# Patient Record
Sex: MALE | Race: WHITE | NOT HISPANIC OR LATINO | URBAN - METROPOLITAN AREA
[De-identification: names, ages, dates, MRNs, and addresses within clinical notes are randomized per-mention and may not be internally consistent; named-entity substitution may affect disease eponyms.]

---

## 2022-12-05 ENCOUNTER — OFFICE VISIT (OUTPATIENT)
Dept: OBGYN CLINIC | Facility: CLINIC | Age: 32
End: 2022-12-05

## 2022-12-05 ENCOUNTER — APPOINTMENT (OUTPATIENT)
Dept: RADIOLOGY | Facility: CLINIC | Age: 32
End: 2022-12-05

## 2022-12-05 VITALS
WEIGHT: 234 LBS | HEART RATE: 73 BPM | HEIGHT: 73 IN | DIASTOLIC BLOOD PRESSURE: 95 MMHG | SYSTOLIC BLOOD PRESSURE: 147 MMHG | BODY MASS INDEX: 31.01 KG/M2

## 2022-12-05 DIAGNOSIS — M62.9 HAMSTRING TIGHTNESS OF BOTH LOWER EXTREMITIES: ICD-10-CM

## 2022-12-05 DIAGNOSIS — M22.2X1 PATELLOFEMORAL SYNDROME OF BOTH KNEES: ICD-10-CM

## 2022-12-05 DIAGNOSIS — M25.361 KNEE INSTABILITY, RIGHT: ICD-10-CM

## 2022-12-05 DIAGNOSIS — R29.898 WEAKNESS OF BOTH HIPS: ICD-10-CM

## 2022-12-05 DIAGNOSIS — M22.2X1 PATELLOFEMORAL SYNDROME OF BOTH KNEES: Primary | ICD-10-CM

## 2022-12-05 DIAGNOSIS — M22.2X2 PATELLOFEMORAL SYNDROME OF BOTH KNEES: ICD-10-CM

## 2022-12-05 DIAGNOSIS — M22.2X2 PATELLOFEMORAL SYNDROME OF BOTH KNEES: Primary | ICD-10-CM

## 2022-12-05 NOTE — PROGRESS NOTES
Assessment/Plan:  Assessment/Plan   Diagnoses and all orders for this visit:    Patellofemoral syndrome of both knees  -     XR knee 4+ vw left injury; Future  -     XR knee 4+ vw right injury; Future  -     Brace  -     Ambulatory Referral to Physical Therapy; Future    Weakness of both hips  -     Ambulatory Referral to Physical Therapy; Future    Hamstring tightness of both lower extremities  -     Ambulatory Referral to Physical Therapy; Future    Knee instability, right  -     Ambulatory Referral to Physical Therapy; Future        69-year-old male [address] [place] [address] with bilateral knee pain, right worse than left, more than few years duration  Discussed with patient physical exam, radiographs, impression and plan  X-rays knees are unremarkable for osseous abnormality or significant degenerative changes  Physical exam right knee noted for tenderness patellar facet, medial joint line, and popliteal fossa  He has full extension and flexion to 130°  There is no appreciable collateral ligament laxity  Left knee unremarkable for bony or soft tissue tenderness  He has abnormal patellar tracking both knees  There is no groin pain with TANNA and FADDIR maneuvers of the hips  He has hamstring tightness both lower extremities and weakness both hips with abduction  Clinical impression is that he is symptomatic from abnormal patellofemoral mechanics  I discussed regimen of supplements, bracing, and formal therapy  Invasive management/surgery not warranted at this time  I provided patellar stabilizing knee braces to wear during physical activity  He is to start taking tumeric at least 1000 mg daily, tart cherry at least 1000 mg daily, and glucosamine-chondroitin 2 to 3 times a day  He is to start physical therapy as soon as possible and do home exercises as directed  He will follow up with me as needed  Subjective:   Patient ID: Destiney Engel is a 27 y o  male    Chief Complaint   Patient presents with   • Right Knee - Pain       78-year-old male [facility] presents for evaluation of bilateral knee pain, right worse than left, more than few years duration  He reports having had injury to right knee while he was still [employer], however was not fully evaluated  He was symptomatic with pain described as generalized knee but worse at the anterior infrapatellar aspect and posterior aspect, nonradiating, worse with prolonged standing and ambulation, worse when rising after prolonged sitting, associated clicking and instability, and improved with resting or changing position  He has been tried wearing knee braces, and does not usually take medication for symptoms  He reports have had x-rays done sometime ago however was not given clear recommendations for treatment  Knee Pain  This is a chronic problem  The current episode started more than 1 year ago  The problem occurs daily  The problem has been gradually worsening  Associated symptoms include arthralgias  Pertinent negatives include no abdominal pain, chest pain, chills, fever, joint swelling, numbness, rash, sore throat or weakness  The symptoms are aggravated by bending, standing and walking  He has tried rest and position changes (Knee brace) for the symptoms  The treatment provided mild relief  The following portions of the patient's history were reviewed and updated as appropriate: He  has no past medical history on file  He  has a past surgical history that includes Shoulder surgery and Elbow surgery (Right)  His family history includes No Known Problems in his father and mother  He  reports that he has quit smoking  His smoking use included cigarettes  He has quit using smokeless tobacco  No history on file for alcohol use and drug use  He has No Known Allergies       Review of Systems   Constitutional: Negative for chills and fever  HENT: Negative for sore throat  Eyes: Negative for visual disturbance  Respiratory: Negative for shortness of breath  Cardiovascular: Negative for chest pain  Gastrointestinal: Negative for abdominal pain  Genitourinary: Negative for flank pain  Musculoskeletal: Positive for arthralgias  Negative for joint swelling  Skin: Negative for rash and wound  Neurological: Negative for weakness and numbness  Hematological: Does not bruise/bleed easily  Psychiatric/Behavioral: Negative for self-injury  Objective:  Vitals:    12/05/22 1306   BP: 147/95   Pulse: 73   Weight: 106 kg (234 lb)   Height: 6' 1" (1 854 m)     Right Ankle Exam     Muscle Strength   Dorsiflexion:  5/5  Plantar flexion:  5/5      Left Ankle Exam     Muscle Strength   Dorsiflexion:  5/5   Plantar flexion:  5/5       Right Knee Exam     Muscle Strength   The patient has normal right knee strength  Tenderness   The patient is experiencing tenderness in the medial joint line (Patellar facet, popliteal fossa)  Range of Motion   Extension: normal   Flexion: 130     Tests   Varus: negative Valgus: negative    Other   Swelling: none    Comments:  Abnormal patellar tracking      Left Knee Exam     Range of Motion   Extension: normal     Other   Swelling: none    Comments:  Abnormal patellar tracking      Right Hip Exam     Muscle Strength   Abduction: 4/5   Flexion: 5/5     Tests   TANNA: negative    Comments:  Negative FADDIR  Hamstring tightness      Left Hip Exam     Muscle Strength   Abduction: 4/5   Flexion: 5/5     Tests   TANNA: negative    Comments:  Negative FADDIR  Hamstring tightness          Strength/Myotome Testing     Left Ankle/Foot   Dorsiflexion: 5  Plantar flexion: 5    Right Ankle/Foot   Dorsiflexion: 5  Plantar flexion: 5      Physical Exam  Vitals and nursing note reviewed  Constitutional:       General: He is not in acute distress  Appearance: He is well-developed  He is not ill-appearing or diaphoretic  HENT:      Head: Normocephalic and atraumatic        Right Ear: External ear normal       Left Ear: External ear normal    Eyes:      Conjunctiva/sclera: Conjunctivae normal    Neck:      Trachea: No tracheal deviation  Cardiovascular:      Rate and Rhythm: Normal rate  Pulmonary:      Effort: Pulmonary effort is normal  No respiratory distress  Abdominal:      General: There is no distension  Musculoskeletal:         General: Tenderness present  No swelling, deformity or signs of injury  Skin:     General: Skin is warm and dry  Coloration: Skin is not jaundiced or pale  Neurological:      Mental Status: He is alert and oriented to person, place, and time  Psychiatric:         Mood and Affect: Mood and affect and mood normal          Behavior: Behavior normal          Thought Content: Thought content normal          Judgment: Judgment normal          I have personally reviewed pertinent films in PACS and my interpretation is No osseous abnormality or significant degenerative changes of the knees

## 2022-12-19 ENCOUNTER — EVALUATION (OUTPATIENT)
Dept: PHYSICAL THERAPY | Facility: CLINIC | Age: 32
End: 2022-12-19

## 2022-12-19 DIAGNOSIS — M22.2X1 PATELLOFEMORAL SYNDROME OF BOTH KNEES: ICD-10-CM

## 2022-12-19 DIAGNOSIS — R29.898 WEAKNESS OF BOTH HIPS: ICD-10-CM

## 2022-12-19 DIAGNOSIS — M62.9 HAMSTRING TIGHTNESS OF BOTH LOWER EXTREMITIES: ICD-10-CM

## 2022-12-19 DIAGNOSIS — M22.2X2 PATELLOFEMORAL SYNDROME OF BOTH KNEES: ICD-10-CM

## 2022-12-19 DIAGNOSIS — M25.361 KNEE INSTABILITY, RIGHT: ICD-10-CM

## 2022-12-19 NOTE — PROGRESS NOTES
PT Evaluation     Today's date: 2022  Patient name: Aby Falcon  : 1990  MRN: [id number]  Referring provider: Saniya Martino  Dx:   Encounter Diagnosis     ICD-10-CM    1  Patellofemoral syndrome of both knees  M22 2X1 Ambulatory Referral to Physical Therapy    M22 2X2       2  Weakness of both hips  R29 898 Ambulatory Referral to Physical Therapy      3  Hamstring tightness of both lower extremities  M62 9 Ambulatory Referral to Physical Therapy      4  Knee instability, right  M25 361 Ambulatory Referral to Physical Therapy          Start Time: 1515  Stop Time: 1600  Total time in clinic (min): 45 minutes    Assessment  Assessment details: Patient is a 27year old male who presents to OP PT with chief complaints of frequent R knee buckling/instability and B knee pain  Patient presents with full knee ROM, negative tests for ligamentous instability and intact sensation bilaterally  Deficits include B LE weakness with R>L, activity intolerance, R knee joint line tenderness, and ambulatory dysfunction  Patient demonstrates functional weakness on B LE and neuromuscular control with lateral step test  Functionally, he has difficulty standing for prolonged periods of time and walking  Discussed plan of care that will focus on LE strengthening with patient and if no progress is made referral for MRI will be warranted  Patient in agreement  Patient was given standing hip ABD, STS, and LAQ as HEP  Patient would benefit from skilled PT services in order to address these deficits and return to PLOF  Impairments: abnormal gait, abnormal movement, activity intolerance, impaired physical strength, lacks appropriate home exercise program and pain with function    Symptom irritability: lowUnderstanding of Dx/Px/POC: good   Prognosis: good    Goals  ST  Patient will be independent with HEP in 3 weeks  2  Patient will improve hip strength by 1 grade in 3 weeks  3   Patient will decrease self reported pain by 2 points in 3 weeks  LT  Patient will be able to stand for >30 min without limitation in 6 weeks  2  Patient will be able to ambulate 50 feet without restrictions or buckling in 6 weeks  3  Patient will be able to complete a shift at work without instances of knee buckling in 6 weeks    Plan  Patient would benefit from: skilled physical therapy  Planned therapy interventions: therapeutic exercise, therapeutic activities, strengthening, patient education, neuromuscular re-education, abdominal trunk stabilization and home exercise program  Frequency: 2x week  Duration in visits: 12  Duration in weeks: 6  Treatment plan discussed with: patient        Subjective Evaluation    History of Present Illness  Mechanism of injury: Patient reports that R knee is very "wobbly" and L knee is also unsteady  Patient states that they give out on him daily and this has been going on for years  Patient is unsure if he has had any prior knee injuries  No surgical history in the knees  Pain  Current pain ratin  At best pain ratin  At worst pain ratin  Location: R knee>L knee  Quality: dull ache  Aggravating factors: standing and walking  Progression: worsening    Social Support  Stairs in house: yes   12  Lives in: multiple-level home    Employment status: working  Treatments  No previous or current treatments  Patient Goals  Patient goal: more stability        Objective     Concurrent Complaints  Negative for night pain, bladder dysfunction and bowel dysfunction    Tenderness     Right Knee   Tenderness in the inferior patella, lateral joint line and medial joint line       Neurological Testing     Sensation     Lumbar   Left   Intact: light touch    Right   Intact: light touch    Knee   Left Knee   Intact: light touch    Right Knee   Intact: light touch     Reflexes   Left   Patellar (L4): normal (2+)    Right   Patellar (L4): normal (2+)    Active Range of Motion     Lumbar   Flexion: WFL  Extension:  WFL  Left lateral flexion:  WFL  Right lateral flexion:  WFL  Left rotation:  WFL  Right rotation:  WFL  Left Knee   Flexion: WFL  Extension: WFL    Right Knee   Flexion: WF  Extension: [facility]    Strength/Myotome Testing     Left Hip   Planes of Motion   Flexion: 4  Extension: 4  Abduction: 4+    Right Hip   Planes of Motion   Flexion: 4-  Extension: 4  Abduction: 4+    Left Knee   Flexion: 4  Prone flexion: 4  Extension: 4+    Right Knee   Flexion: 4-  Prone flexion: 4-  Extension: 4+    Left Ankle/Foot   Dorsiflexion: 5    Right Ankle/Foot   Dorsiflexion: 5    Tests     Left Knee   Negative anterior drawer, posterior drawer, valgus stress test at 30 degrees and varus stress test at 30 degrees  Right Knee   Negative anterior drawer, posterior drawer, valgus stress test at 30 degrees and varus stress test at 30 degrees       Ambulation     Ambulation: Level Surfaces   Ambulation without assistive device: independent    Additional Level Surfaces Ambulation Details  Ambulated with R LE maintained in increased ER compared to L LE, observable R knee buckling with more than 50% of steps    General Comments:      Knee Comments  SLS R: 30 sec with observed knee instability  SLS L: 30 sec with observed knee instability    Stairs: ascends and descends reciprocally without HR and minimal knee instability noted    Lateral step test: B knee valgus, lateral trunk lean and arm strategy      Access Code [email or web address]         Precautions: knee buckling    Manuals 12/19                                                                Neuro Re-Ed             Lateral step down             SLS on foam             Bosu lunge                                                                 Ther Ex             Standing hip ABD x10            LAQ x10            HS stretch             SLR             TB side stepping             Clamshell             Monster walk                          Ther Activity             STS x10 Step up lateral             Sled push             Step up/down             Gait Training                                       Modalities

## 2022-12-20 ENCOUNTER — OFFICE VISIT (OUTPATIENT)
Dept: PHYSICAL THERAPY | Facility: CLINIC | Age: 32
End: 2022-12-20

## 2022-12-20 DIAGNOSIS — M22.2X2 PATELLOFEMORAL SYNDROME OF BOTH KNEES: Primary | ICD-10-CM

## 2022-12-20 DIAGNOSIS — M22.2X1 PATELLOFEMORAL SYNDROME OF BOTH KNEES: Primary | ICD-10-CM

## 2022-12-20 DIAGNOSIS — R29.898 WEAKNESS OF BOTH HIPS: ICD-10-CM

## 2022-12-20 NOTE — PROGRESS NOTES
Daily Note     Today's date: 2022  Patient name: Alhaji Elise  : 1990  MRN: [id number]  Referring provider: Osmar Worthington  Dx:   Encounter Diagnosis     ICD-10-CM    1  Patellofemoral syndrome of both knees  M22 2X1     M22 2X2       2  Weakness of both hips  R29 898           Start Time: 1517  Stop Time: 1602  Total time in clinic (min): 45 minutes    Subjective: Patient has no new reports since yesterday's evaluation, says he is feeling good  Objective: See treatment diary below      Assessment: Patient demonstrates improving BLE strength this session  Patient completed today's session without pain  Recumbent bike performed in order to improve LE muscular endurance  Patient demonstrates improving neuromuscular control with lateral heel tap exercise with less knee valgus and trunk lean after therapist demonstration  Strength and balance deficits > on R LE compared to L  Future sessions should continue to progress LE strengthening exercises as able  Tolerated treatment well  Patient demonstrated fatigue post treatment and would benefit from continued PT      Plan: Continue per plan of care        Precautions: knee buckling    Manuals                                                                Neuro Re-Ed             Lateral step down  2x10           SLS w ball toss  1x10           Bosu lunge                                                                 Ther Ex             SL hip ABD  3x10  1#           Standing hip ABD x10 HEP           LAQ x10 3x10  #3           HS stretch             SLR  3x10  #3           TB side stepping  3 laps  gtb           Clamshell  3x10  rtb           Monster walk  3 laps  gtb           PB hamstring curl             Prone hip ext             Leg press  #45           Ther Activity             STS x10 2x10           Step up lateral  2x10                        Step up/down             Gait Training Modalities

## 2022-12-27 ENCOUNTER — OFFICE VISIT (OUTPATIENT)
Dept: PHYSICAL THERAPY | Facility: CLINIC | Age: 32
End: 2022-12-27

## 2022-12-27 DIAGNOSIS — M22.2X1 PATELLOFEMORAL SYNDROME OF BOTH KNEES: Primary | ICD-10-CM

## 2022-12-27 DIAGNOSIS — M22.2X2 PATELLOFEMORAL SYNDROME OF BOTH KNEES: Primary | ICD-10-CM

## 2022-12-27 DIAGNOSIS — M25.361 KNEE INSTABILITY, RIGHT: ICD-10-CM

## 2022-12-27 NOTE — PROGRESS NOTES
Daily Note     Today's date: 2022  Patient name: Silvina Flores  : 1990  MRN: [id number]  Referring provider: Carlos Tidwell  Dx:   Encounter Diagnosis     ICD-10-CM    1  Patellofemoral syndrome of both knees  M22 2X1     M22 2X2       2  Knee instability, right  M25 361           Start Time: 732  Stop Time: 818  Total time in clinic (min): 46 minutes    Subjective: Patient reports that his knees feel about the same since starting therapy last week  Patient states that he has been keeping up with his HEP  Objective: See treatment diary below      Assessment: Patient demonstrates improving BLE strength with static exercises this session  Patient completed today's session without pain  Recumbent bike performed in order to  improve LE muscular endurance  1 instance of R knee buckling at the beginning of the session but no LOB noted  Patient uses UEs to help maintain balance during lateral heel tap exercise due to deficits in neuromuscular control  Future sessions should continue to progress LE strengthening exercises as able  Tolerated treatment well  Patient demonstrated fatigue post treatment and would benefit from continued PT      Plan: Continue per plan of care        Precautions: knee buckling    Manuals                                                               Neuro Re-Ed             Lateral step down  2x10 2x10          SLS w ball toss  1x10 x20 ea          Bosu lunge                                                                 Ther Ex             SL hip ABD  3x10  1# 3x10  1#          Standing hip ABD x10 HEP           LAQ x10 3x10  #3 3x10  3#          HS stretch             SLR  3x10  #3 3x10  #3          TB side stepping  3 laps  gtb 6 laps  gtb          Clamshell  3x10  rtb 3x10  rtb          Monster walk  3 laps  gtb 6 laps  gtb          PB hamstring curl             Prone hip ext             Bridges   x20 5"          Bike  6 min 6 min Leg press  #45 #85          Ther Activity             STS x10 2x10 3x10          Step up lateral  2x10 2x10                       Step up/down             Gait Training                                       Modalities

## 2022-12-29 ENCOUNTER — OFFICE VISIT (OUTPATIENT)
Dept: PHYSICAL THERAPY | Facility: CLINIC | Age: 32
End: 2022-12-29

## 2022-12-29 DIAGNOSIS — M22.2X2 PATELLOFEMORAL SYNDROME OF BOTH KNEES: Primary | ICD-10-CM

## 2022-12-29 DIAGNOSIS — R29.898 WEAKNESS OF BOTH HIPS: ICD-10-CM

## 2022-12-29 DIAGNOSIS — M22.2X1 PATELLOFEMORAL SYNDROME OF BOTH KNEES: Primary | ICD-10-CM

## 2022-12-29 DIAGNOSIS — M25.361 KNEE INSTABILITY, RIGHT: ICD-10-CM

## 2022-12-29 NOTE — PROGRESS NOTES
Daily Note     Today's date: 2022  Patient name: Nguyen Dolan  : 1990  MRN: [id number]  Referring provider: Araseli Rome  Dx:   Encounter Diagnosis     ICD-10-CM    1  Patellofemoral syndrome of both knees  M22 2X1     M22 2X2       2  Knee instability, right  M25 361       3  Weakness of both hips  R29 898           Start Time: 4048  Stop Time: 08  Total time in clinic (min): 48 minutes    Subjective: Patient reports that his knees are not doing well  Patient states that he had an instance where his knee buckled and nearly fell all the way to the floor  Patient states that his knees just feel really weak  Objective: See treatment diary below      Assessment: Patient demonstrates improving BLE strength this session  Patient completed today's session without pain  Recumbent bike performed in order to improve LE muscular endurance  Increased frequency of knee buckling when ambulating in the clinic  Neuromuscular control is improving with patient able to perform lateral heel tap with 8" step  L neuromuscular control > R  Future sessions should continue to progress LE strengthening exercises as able  Tolerated treatment well  Patient would benefit from continued PT        Plan: Continue per plan of care        Precautions: knee buckling    Manuals                                                              Neuro Re-Ed             Lateral step down  2x10 2x10 2x10  8" step         SLS w ball toss  1x10 x20 ea x20 ylw w foam         Bosu lunge                                                                 Ther Ex             SL hip ABD  3x10  1# 3x10  1# 3x10  #2         Standing hip ABD x10 HEP           LAQ x10 3x10  #3 3x10  3# 4x10  #4         HS stretch             SLR  3x10  #3 3x10  #3 4x10  #4         TB side stepping  3 laps  gtb 6 laps  gtb 6 laps   btb         Clamshell  3x10  rtb 3x10  rtb 3x10  rtb         Monster walk  3 laps  gtb 6 laps  gtb 6 laps  btb         PB hamstring curl    2x10         Prone hip ext             Bridges   x20 5"          Bike  6 min 6 min 6 min         Leg press  #45 #85 #85 x30         Ther Activity             STS x10 2x10 3x10 3x10         Step up lateral  2x10 2x10                       Step up/down             Gait Training                                       Modalities

## 2023-01-03 ENCOUNTER — OFFICE VISIT (OUTPATIENT)
Dept: PHYSICAL THERAPY | Facility: CLINIC | Age: 33
End: 2023-01-03

## 2023-01-03 DIAGNOSIS — M22.2X1 PATELLOFEMORAL SYNDROME OF BOTH KNEES: Primary | ICD-10-CM

## 2023-01-03 DIAGNOSIS — M22.2X2 PATELLOFEMORAL SYNDROME OF BOTH KNEES: Primary | ICD-10-CM

## 2023-01-03 DIAGNOSIS — R29.898 WEAKNESS OF BOTH HIPS: ICD-10-CM

## 2023-01-03 DIAGNOSIS — M25.361 KNEE INSTABILITY, RIGHT: ICD-10-CM

## 2023-01-03 NOTE — PROGRESS NOTES
Daily Note     Today's date: 1/3/2023  Patient name: Leidy Cherry  : 1990  MRN: [id number]  Referring provider: Leia Bowen  Dx:   Encounter Diagnosis     ICD-10-CM    1  Patellofemoral syndrome of both knees  M22 2X1     M22 2X2       2  Knee instability, right  M25 361       3  Weakness of both hips  R29 898           Start Time: 0747  Stop Time: 08  Total time in clinic (min): 43 minutes    Subjective: Patient reports that his knees feel about the same with them constantly giving out  Patient states that doesn't feel any stronger in his legs yet  Objective: See treatment diary below      Assessment: Patient demonstrates improving BLE strength, but consistent neuromuscular control this session  Patient completed today's session without pain  Recumbent bike performed in order to improve LE muscular endurance  LE strengthening exercises progressed in weight this session  R knee buckling continues to occur during normal ambulation  Future sessions should continue to progress strengthening and neuromuscular control exercises as able  Tolerated treatment well  Patient would benefit from continued PT        Plan: Continue per plan of care        Precautions: knee buckling    Manuals 3                                                            Neuro Re-Ed             Lateral step down  2x10 2x10 2x10  8" step 2x10  8" step        SLS w ball toss  1x10 x20 ea x20 ylw w foam         Bosu lunge                                                                 Ther Ex             SL hip ABD  3x10  1# 3x10  1# 3x10  #2 3x10  3#        Standing hip ABD x10 HEP           LAQ x10 3x10  #3 3x10  3# 4x10  #4 3x10  #5        HS stretch             SLR  3x10  #3 3x10  #3 4x10  #4 3x10  #5        TB side stepping  3 laps  gtb 6 laps  gtb 6 laps   btb 6 laps  btb        Clamshell  3x10  rtb 3x10  rtb 3x10  rtb         Monster walk  3 laps  gtb 6 laps  gtb 6 laps  btb 6 laps  btb PB hamstring curl    2x10 2x10        Prone hip ext             Bridges   x20 5"          Bike  6 min 6 min 6 min 7 min        Leg press  #45 #85 #85 x30 #125  4x10        Ther Activity             STS x10 2x10 3x10 3x10 #10 3x10        Step up lateral  2x10 2x10                       Step up/down             Gait Training                                       Modalities

## 2023-01-05 ENCOUNTER — OFFICE VISIT (OUTPATIENT)
Dept: PHYSICAL THERAPY | Facility: CLINIC | Age: 33
End: 2023-01-05

## 2023-01-05 DIAGNOSIS — M22.2X2 PATELLOFEMORAL SYNDROME OF BOTH KNEES: Primary | ICD-10-CM

## 2023-01-05 DIAGNOSIS — M22.2X1 PATELLOFEMORAL SYNDROME OF BOTH KNEES: Primary | ICD-10-CM

## 2023-01-05 DIAGNOSIS — M25.361 KNEE INSTABILITY, RIGHT: ICD-10-CM

## 2023-01-05 DIAGNOSIS — R29.898 WEAKNESS OF BOTH HIPS: ICD-10-CM

## 2023-01-05 NOTE — PROGRESS NOTES
Daily Note     Today's date: 2023  Patient name: Pily Ceballos  : 1990  MRN: [id number]  Referring provider: Sherlyn Howell  Dx:   Encounter Diagnosis     ICD-10-CM    1  Patellofemoral syndrome of both knees  M22 2X1     M22 2X2       2  Knee instability, right  M25 361       3  Weakness of both hips  R29 898           Start Time:   Stop Time: 08  Total time in clinic (min): 46 minutes    Subjective: Patient reports that he feels like his knees are just getting looser and looser  Patient reports that he feels like his leg strength is about the same  Objective: See treatment diary below      Assessment: Patient demonstrates improving BLE strength this session  Patient completed today's session without increase in pain  Recumbent bike performed this session in order to improve LE muscular endurance  Shifted focus to neuromuscular control this session to address the reported "looseness" in both knees  Patient demonstrates lack of knee control with single leg tasks  Future sessions should continue to progress LE strengthening exercises as able  Tolerated treatment well  Patient would benefit from continued PT        Plan: Continue per plan of care        Precautions: knee buckling    Manuals 12/19 12/20 12/27 12/29 1/3 1/5                                                           Neuro Re-Ed             Lateral step down  2x10 2x10 2x10  8" step 2x10  8" step 3x10  6" step       SLS w ball toss  1x10 x20 ea x20 ylw w foam  x20 ylw w foam       Bosu lunge             Y balance      2x5 ea                                              Ther Ex             SL hip ABD  3x10  1# 3x10  1# 3x10  #2 3x10  3# 3x10  3#       Standing hip ABD x10 HEP           LAQ x10 3x10  #3 3x10  3# 4x10  #4 3x10  #5 3x10  #5       HS stretch             SLR  3x10  #3 3x10  #3 4x10  #4 3x10  #4 3x10  #4       TB side stepping  3 laps  gtb 6 laps  gtb 6 laps   btb 6 laps  btb        Clamshell  3x10  rtb 3x10  rtb 3x10  rtb  3x10  gtb       Monster walk  3 laps  gtb 6 laps  gtb 6 laps  btb 6 laps  btb        PB hamstring curl    2x10 2x10        Prone hip ext             Bridges   x20 5"          Bike  6 min 6 min 6 min 7 min 6 min       Leg press  #45 #85 #85 x30 #125  4x10 #125  4x10       Ther Activity             STS x10 2x10 3x10 3x10 #10 3x10        Step up lateral  2x10 2x10                       Step up/down             Gait Training                                       Modalities

## 2023-01-09 ENCOUNTER — OFFICE VISIT (OUTPATIENT)
Dept: PHYSICAL THERAPY | Facility: CLINIC | Age: 33
End: 2023-01-09

## 2023-01-09 DIAGNOSIS — M22.2X1 PATELLOFEMORAL SYNDROME OF BOTH KNEES: Primary | ICD-10-CM

## 2023-01-09 DIAGNOSIS — M25.361 KNEE INSTABILITY, RIGHT: ICD-10-CM

## 2023-01-09 DIAGNOSIS — R29.898 WEAKNESS OF BOTH HIPS: ICD-10-CM

## 2023-01-09 DIAGNOSIS — M22.2X2 PATELLOFEMORAL SYNDROME OF BOTH KNEES: Primary | ICD-10-CM

## 2023-01-09 NOTE — PROGRESS NOTES
Daily Note     Today's date: 2023  Patient name: Radha Carlos  : 1990  MRN: [id number]  Referring provider: Mikaela Hendricks  Dx:   Encounter Diagnosis     ICD-10-CM    1  Patellofemoral syndrome of both knees  M22 2X1     M22 2X2       2  Knee instability, right  M25 361       3  Weakness of both hips  R29 898           Start Time: 0730  Stop Time: 0815  Total time in clinic (min): 45 minutes    Subjective: Patient reports that his knees feel about the same since starting therapy  Objective: See treatment diary below      Assessment: Patient demonstrates consistent LE strength and neuromuscular control this session  Patient completed today's session without any pain  Recumbent bike performed in order to improve LE muscular endurance  Patient has been attending OP PT for the last 4 weeks with no change in knee instability during standing and walking, despite gains in strength and neuromuscular control  Due to the lack of expected progress, I recommended the patient continue therapy at this time while following up with Dr Mary Fenton  Tolerated treatment well  Patient would benefit from continued PT and follow up with physician  Plan: Continue per plan of care        Precautions: knee buckling    Manuals 12/19 12/20 12/27 12/29 1/3 1/5 1/9                                                          Neuro Re-Ed             Lateral step down  2x10 2x10 2x10  8" step 2x10  8" step 3x10  6" step 3x10  6" step      SLS w ball toss  1x10 x20 ea x20 ylw w foam  x20 ylw w foam 2x15 ylw w foam      Bosu lunge       x10 ea      Y balance      2x5 ea 3x5 ea                                             Ther Ex             SL hip ABD  3x10  1# 3x10  1# 3x10  #2 3x10  3# 3x10  3# 3x10  3#      Standing hip ABD x10 HEP           LAQ x10 3x10  #3 3x10  3# 4x10  #4 3x10  #5 3x10  #5       HS stretch             SLR  3x10  #3 3x10  #3 4x10  #4 3x10  #4 3x10  #4       TB side stepping  3 laps  gtb 6 laps  gtb 6 laps   btb 6 laps  btb        Clamshell  3x10  rtb 3x10  rtb 3x10  rtb  3x10  gtb 3x10  gtb      Monster walk  3 laps  gtb 6 laps  gtb 6 laps  btb 6 laps  btb        PB hamstring curl    2x10 2x10  2x10      Prone hip ext             Bridges   x20 5"          Bike  6 min 6 min 6 min 7 min 6 min 6 min      Leg press  #45 #85 #85 x30 #125  4x10 #125  4x10 135#  4x10      Ther Activity             STS x10 2x10 3x10 3x10 #10 3x10        Step up lateral  2x10 2x10                       Step up/down             Gait Training                                       Modalities

## 2023-01-10 ENCOUNTER — OFFICE VISIT (OUTPATIENT)
Dept: OBGYN CLINIC | Facility: CLINIC | Age: 33
End: 2023-01-10

## 2023-01-10 ENCOUNTER — OFFICE VISIT (OUTPATIENT)
Dept: PHYSICAL THERAPY | Facility: CLINIC | Age: 33
End: 2023-01-10

## 2023-01-10 VITALS
SYSTOLIC BLOOD PRESSURE: 136 MMHG | BODY MASS INDEX: 34.46 KG/M2 | WEIGHT: 260 LBS | HEIGHT: 73 IN | DIASTOLIC BLOOD PRESSURE: 90 MMHG | HEART RATE: 75 BPM

## 2023-01-10 DIAGNOSIS — R29.898 WEAKNESS OF BOTH HIPS: ICD-10-CM

## 2023-01-10 DIAGNOSIS — M25.361 KNEE INSTABILITY, RIGHT: ICD-10-CM

## 2023-01-10 DIAGNOSIS — M62.9 HAMSTRING TIGHTNESS OF BOTH LOWER EXTREMITIES: ICD-10-CM

## 2023-01-10 DIAGNOSIS — M22.2X2 PATELLOFEMORAL SYNDROME OF BOTH KNEES: Primary | ICD-10-CM

## 2023-01-10 DIAGNOSIS — M23.91 INTERNAL DERANGEMENT OF RIGHT KNEE: Primary | ICD-10-CM

## 2023-01-10 DIAGNOSIS — M23.92 INTERNAL DERANGEMENT OF LEFT KNEE: ICD-10-CM

## 2023-01-10 DIAGNOSIS — M22.2X1 PATELLOFEMORAL SYNDROME OF BOTH KNEES: Primary | ICD-10-CM

## 2023-01-10 NOTE — PROGRESS NOTES
Assessment/Plan:  Assessment/Plan   Diagnoses and all orders for this visit:    Internal derangement of right knee  -     MRI knee right  wo contrast; Future    Internal derangement of left knee  -     MRI knee left  wo contrast; Future          58-year-old male [facility] with bilateral knee pain and instability more than few years duration  Discussed with patient physical exam, imaging studies, impression and plan  X-rays of both knees are unremarkable for osseous abnormality  Physical noted for swelling of the knees  He has tenderness patellar facet both knees  He has full extension and flexion to 120  There is no appreciable collateral ligament laxity  There is positive patellar inhibition and grind both knees  Clinical impression is that he may be symptomatic from cartilage defect or meniscus  Tear  Symptoms persist despite formal therapy and home exercises since 12/19/2022, wearing knee braces since 12/05/2022  At this time I will refer him for MRI of the right knee and MRI of the left knee to evaluate for meniscus tear and cartilage defect as invasive management may be warranted  He will follow up after getting MRIs done  Subjective:   Patient ID: Yariel Taylor is a 27 y o  male  Chief Complaint   Patient presents with   • Right Knee - Follow-up, Pain   • Left Knee - Follow-up         58-year-old male [facility] following up for bilateral knee pain and instability more than few years duration  He was last seen by me 5 weeks ago at which point he was provided with knee braces and referred to formal therapy  He has been attending formal therapy and doing home exercises since 12/19/2022  He denies improvement symptoms since his last visit  He reports the knees still giving out on him    He reports having pain described as generalized to the knees but worse at the anterior and posterior aspects, nonradiating, worse with prolonged standing and ambulation, worse when rising from prolonged sitting, associated clicking and instability, and improved with resting or changing position  He states that he feels that formal therapy has helped with strength in the legs however pain and instability have not improved  Knee Pain  This is a chronic problem  The current episode started more than 1 year ago  The problem occurs daily  The problem has been unchanged  Associated symptoms include arthralgias and joint swelling  Pertinent negatives include no numbness or weakness  The symptoms are aggravated by standing and walking  He has tried rest and position changes (  Knee brace, physical therapy, home exercise) for the symptoms  The treatment provided mild relief  Review of Systems   Musculoskeletal: Positive for arthralgias and joint swelling  Neurological: Negative for weakness and numbness  Objective:  Vitals:    01/10/23 1329   BP: 136/90   Pulse: 75   Weight: 118 kg (260 lb)   Height: 6' 1" (1 854 m)     Right Knee Exam     Tenderness   Right knee tenderness location:  patellar facet  Range of Motion   Extension: normal   Flexion: 120     Tests   Tracy:  Medial - negative Lateral - negative  Varus: negative Valgus: negative    Other   Swelling: mild    Comments:   Positive patellar inhibition and grind      Left Knee Exam     Tenderness   Left knee tenderness location:  patellar facet  Range of Motion   Extension: normal   Flexion: 120     Tests   Tracy:  Medial - negative Lateral - negative  Varus: negative Valgus: negative    Other   Swelling: mild    Comments:   Positive patellar inhibition and grind            Physical Exam  Vitals and nursing note reviewed  Constitutional:       General: He is not in acute distress  Appearance: He is well-developed  He is not ill-appearing or diaphoretic  HENT:      Head: Normocephalic and atraumatic        Right Ear: External ear normal       Left Ear: External ear normal    Eyes: Conjunctiva/sclera: Conjunctivae normal    Neck:      Trachea: No tracheal deviation  Cardiovascular:      Rate and Rhythm: Normal rate  Pulmonary:      Effort: Pulmonary effort is normal  No respiratory distress  Abdominal:      General: There is no distension  Musculoskeletal:         General: Swelling and tenderness present  No deformity or signs of injury  Right knee:      Instability Tests: Medial Tracy test negative and lateral Tracy test negative  Left knee:      Instability Tests: Medial Tracy test negative and lateral Tracy test negative  Skin:     General: Skin is warm and dry  Coloration: Skin is not jaundiced or pale  Neurological:      Mental Status: He is alert and oriented to person, place, and time  Psychiatric:         Mood and Affect: Mood normal          Behavior: Behavior normal          Thought Content:  Thought content normal          Judgment: Judgment normal

## 2023-01-10 NOTE — PROGRESS NOTES
Daily Note     Today's date: 1/10/2023  Patient name: Can Wright  : 1990  MRN: [id number]  Referring provider: [place]  Dx:   Encounter Diagnosis     ICD-10-CM    1  Patellofemoral syndrome of both knees  M22 2X1     M22 2X2       2  Knee instability, right  M25 361       3  Weakness of both hips  R29 898       4  Hamstring tightness of both lower extremities  M62 9           Start Time: 6585  Stop Time: 819  Total time in clinic (min): 48 minutes    Subjective: Patient reports that he was able to get an appointment with Dr Machelle Fraire for today  Patient states that he had a fall last night but it was due to his dog tripping him, not his knee giving out  Objective: See treatment diary below      Assessment: Patient demonstrates consistent LE strength and improving neuromuscular control this session  Patient completed today's session without pain  Recumbent bike performed in order to improve LE muscular endurance  Added single leg pallof press and single leg STS to continue to challenge neuromuscular control  Future sessions should continue to progress knee strengthening exercise and neuromuscular control exercises as able  Tolerated treatment well  Patient demonstrated fatigue post treatment and would benefit from continued PT        Plan: Continue per plan of care        Precautions: knee buckling    Manuals 12/19 12/20 12/27 12/29 1/3 1/5 1/9 1/10                                                         Neuro Re-Ed             Lateral step down  2x10 2x10 2x10  8" step 2x10  8" step 3x10  6" step 3x10  6" step      SLS w ball toss  1x10 x20 ea x20 ylw w foam  x20 ylw w foam 2x15 ylw w foam x20 ylw w foam     Bosu lunge       x10 ea x15 ea     Y balance      2x5 ea 3x5 ea 2x5 ea     SL Pallof press        x20 ea rtb                               Ther Ex             SL hip ABD  3x10  1# 3x10  1# 3x10  #2 3x10  3# 3x10  3# 3x10  3# 3x10  #3     Standing hip ABD x10 HEP           LAQ x10 3x10  #3 3x10  3# 4x10  #4 3x10  #5 3x10  #5  3x10  #7 5     HS stretch             SLR  3x10  #3 3x10  #3 4x10  #4 3x10  #4 3x10  #4  3x10  #7 5     TB side stepping  3 laps  gtb 6 laps  gtb 6 laps   btb 6 laps  btb        Clamshell  3x10  rtb 3x10  rtb 3x10  rtb  3x10  gtb 3x10  gtb 3x10  btb     Monster walk  3 laps  gtb 6 laps  gtb 6 laps  btb 6 laps  btb        PB hamstring curl    2x10 2x10  2x10      Prone hip ext             Bridges   x20 5"          Bike  6 min 6 min 6 min 7 min 6 min 6 min 7 min     Leg press  #45 #85 #85 x30 #125  4x10 #125  4x10 135#  4x10 145#  4x10     Ther Activity             STS x10 2x10 3x10 3x10 #10 3x10   x10 ea     Step up lateral  2x10 2x10                       Step up/down             Gait Training                                       Modalities

## 2023-01-16 ENCOUNTER — OFFICE VISIT (OUTPATIENT)
Dept: PHYSICAL THERAPY | Facility: CLINIC | Age: 33
End: 2023-01-16

## 2023-01-16 DIAGNOSIS — M62.9 HAMSTRING TIGHTNESS OF BOTH LOWER EXTREMITIES: ICD-10-CM

## 2023-01-16 DIAGNOSIS — R29.898 WEAKNESS OF BOTH HIPS: ICD-10-CM

## 2023-01-16 DIAGNOSIS — M22.2X2 PATELLOFEMORAL SYNDROME OF BOTH KNEES: Primary | ICD-10-CM

## 2023-01-16 DIAGNOSIS — M22.2X1 PATELLOFEMORAL SYNDROME OF BOTH KNEES: Primary | ICD-10-CM

## 2023-01-16 DIAGNOSIS — M25.361 KNEE INSTABILITY, RIGHT: ICD-10-CM

## 2023-01-16 NOTE — PROGRESS NOTES
Daily Note     Today's date: 2023  Patient name: Iris Allison  : 1990  MRN: [id number]  Referring provider: Brady Grey  Dx:   Encounter Diagnosis     ICD-10-CM    1  Patellofemoral syndrome of both knees  M22 2X1     M22 2X2       2  Knee instability, right  M25 361       3  Weakness of both hips  R29 898       4  Hamstring tightness of both lower extremities  M62 9           Start Time: 0732  Stop Time: 3775  Total time in clinic (min): 42 minutes    Subjective: Patient reports that his knees feel about the same  Patient saw Dr Héctor Wilburn last week and has an MRI scheduled for   Objective: See treatment diary below      Assessment: Patient demonstrates improving BLE strength and neuromuscular control this session  Despite improvements in static strength and balance, knee buckling still occurs during ambulation  Patient completed today's session without pain  Recumbent bike performed in order to improve LE muscular endurance  Increased difficulty maintaining upright trunk during lateral heel tap exercise as patient reports increased fatigue  Plan is to continue PT until MRI results are received  Future sessions should continue to progress strength and balance exercises as able  Tolerated treatment well  Patient would benefit from continued PT      Plan: Continue per plan of care        Precautions: knee buckling    Manuals 12/19 12/20 12/27 12/29 1/3 1/5 1/9 1/10 1/16                                                        Neuro Re-Ed             Lateral step down  2x10 2x10 2x10  8" step 2x10  8" step 3x10  6" step 3x10  6" step  2x10  6" step    SLS w ball toss  1x10 x20 ea x20 ylw w foam  x20 ylw w foam 2x15 ylw w foam x20 ylw w foam x20 ylw w foam    Bosu lunge       x10 ea x15 ea x15 ea    Y balance      2x5 ea 3x5 ea 2x5 ea 2x5 ea    SL Pallof press        x20 ea rtb x20 ea gtb                              Ther Ex             SL hip ABD  3x10  1# 3x10  1# 3x10  #2 3x10  3# 3x10  3# 3x10  3# 3x10  #3 3x10  #3    Standing hip ABD x10 HEP           LAQ x10 3x10  #3 3x10  3# 4x10  #4 3x10  #5 3x10  #5  3x10  #7 5 3x10  #7 5    HS stretch             SLR  3x10  #3 3x10  #3 4x10  #4 3x10  #4 3x10  #4  3x10  #7 5 3x10  #7 5    TB side stepping  3 laps  gtb 6 laps  gtb 6 laps   btb 6 laps  btb        Clamshell  3x10  rtb 3x10  rtb 3x10  rtb  3x10  gtb 3x10  gtb 3x10  btb     Monster walk  3 laps  gtb 6 laps  gtb 6 laps  btb 6 laps  btb        PB hamstring curl    2x10 2x10  2x10      Prone hip ext             Bridges   x20 5"          Bike  6 min 6 min 6 min 7 min 6 min 6 min 7 min 6 min    Leg press  #45 #85 #85 x30 #125  4x10 #125  4x10 135#  4x10 145#  4x10 145#  4x10    Ther Activity             SL STS x10 2x10 3x10 3x10 #10 3x10   x10 ea x10 ea    Step up lateral  2x10 2x10                       Step up/down             Gait Training                                       Modalities

## 2023-01-17 ENCOUNTER — OFFICE VISIT (OUTPATIENT)
Dept: PHYSICAL THERAPY | Facility: CLINIC | Age: 33
End: 2023-01-17

## 2023-01-17 DIAGNOSIS — M22.2X2 PATELLOFEMORAL SYNDROME OF BOTH KNEES: Primary | ICD-10-CM

## 2023-01-17 DIAGNOSIS — M62.9 HAMSTRING TIGHTNESS OF BOTH LOWER EXTREMITIES: ICD-10-CM

## 2023-01-17 DIAGNOSIS — R29.898 WEAKNESS OF BOTH HIPS: ICD-10-CM

## 2023-01-17 DIAGNOSIS — M22.2X1 PATELLOFEMORAL SYNDROME OF BOTH KNEES: Primary | ICD-10-CM

## 2023-01-17 DIAGNOSIS — M25.361 KNEE INSTABILITY, RIGHT: ICD-10-CM

## 2023-01-17 NOTE — PROGRESS NOTES
Daily Note     Today's date: 2023  Patient name: Joseph Juarez  : 1990  MRN: [id number]  Referring provider: Daniel Tran  Dx:   Encounter Diagnosis     ICD-10-CM    1  Patellofemoral syndrome of both knees  M22 2X1     M22 2X2       2  Knee instability, right  M25 361       3  Weakness of both hips  R29 898       4  Hamstring tightness of both lower extremities  M62 9           Start Time: 732  Stop Time:   Total time in clinic (min): 49 minutes    Subjective: Patient has no new reports since yesterday's visit  Patient reports some soreness after yesterday's session  Objective: See treatment diary below      Assessment: Patient demonstrates improving BLE strength and balance this session  Patient completed today's session without increase in pain  Recumbent bike performed in order to improve LE muscular endurance  Patient continues to demonstrate good strength with increasing weight for leg press and table exercises attempting to reach muscle fatigue  Y balance remains difficult as neuromuscular control deficits remain  Future sessions should continue to progress strength and balance exercises as able  Tolerated treatment well  Patient would benefit from continued PT      Plan: Continue per plan of care        Precautions: knee buckling    Manuals  1/3 1/5 1/9 1/10 1/16 1/17                                                       Neuro Re-Ed             Lateral step down  2x10 2x10 2x10  8" step 2x10  8" step 3x10  6" step 3x10  6" step  2x10  6" step 2x10 6"  step   SLS w ball toss  1x10 x20 ea x20 ylw w foam  x20 ylw w foam 2x15 ylw w foam x20 ylw w foam x20 ylw w foam x20 ylw w foam   Bosu lunge       x10 ea x15 ea x15 ea x15 ea   Y balance      2x5 ea 3x5 ea 2x5 ea 2x5 ea 2x5 ea   SL Pallof press        x20 ea rtb x20 ea gtb x20 ea btb                             Ther Ex             SL hip ABD  3x10  1# 3x10  1# 3x10  #2 3x10  3# 3x10  3# 3x10  3# 3x10  #3 3x10  #3 3x10  #3   Standing hip ABD x10 HEP           LAQ x10 3x10  #3 3x10  3# 4x10  #4 3x10  #5 3x10  #5  3x10  #7 5 3x10  #7 5 3x10  #7 5   HS stretch             SLR  3x10  #3 3x10  #3 4x10  #4 3x10  #4 3x10  #4  3x10  #7 5 3x10  #7 5 3x10  #7 5   TB side stepping  3 laps  gtb 6 laps  gtb 6 laps   btb 6 laps  btb        Clamshell  3x10  rtb 3x10  rtb 3x10  rtb  3x10  gtb 3x10  gtb 3x10  btb  3x10  btb   Monster walk  3 laps  gtb 6 laps  gtb 6 laps  btb 6 laps  btb        PB hamstring curl    2x10 2x10  2x10   2x10   Prone hip ext             Bridges   x20 5"          Bike  6 min 6 min 6 min 7 min 6 min 6 min 7 min 6 min 6 min   Leg press  #45 #85 #85 x30 #125  4x10 #125  4x10 135#  4x10 145#  4x10 145#  4x10 185#  3x10   Ther Activity             SL STS x10 2x10 3x10 3x10 #10 3x10   x10 ea x10 ea x10 ea   Step up lateral  2x10 2x10                       Step up/down             Gait Training                                       Modalities

## 2023-01-23 ENCOUNTER — EVALUATION (OUTPATIENT)
Dept: PHYSICAL THERAPY | Facility: CLINIC | Age: 33
End: 2023-01-23

## 2023-01-23 DIAGNOSIS — M22.2X1 PATELLOFEMORAL SYNDROME OF BOTH KNEES: ICD-10-CM

## 2023-01-23 DIAGNOSIS — M25.361 KNEE INSTABILITY, RIGHT: Primary | ICD-10-CM

## 2023-01-23 DIAGNOSIS — M22.2X2 PATELLOFEMORAL SYNDROME OF BOTH KNEES: ICD-10-CM

## 2023-01-23 NOTE — PROGRESS NOTES
PT Re-Evaluation     Today's date: 2023  Patient name: Shanelle Davalos  : 1990  MRN: [id number]  Referring provider: Lauren Graham  Dx:   Encounter Diagnosis     ICD-10-CM    1  Knee instability, right  M25 361       2  Patellofemoral syndrome of both knees  M22 2X1     M22 2X2           Start Time: 1044  Stop Time: 0819  Total time in clinic (min): 46 minutes    Assessment  Assessment details: Patient is a 27year old male who has been attending OP PT for the last 6 weeks with chief complaints of frequent R knee buckling/instability and B knee pain  Patient presents with full knee ROM, negative tests for ligamentous instability and intact sensation bilaterally  Over the past 6 weeks patient has made significant improvements in B LE with B hip flexion being the only remaining strength limitation  Remaining deficits include neuromuscular control with lateral step test and hamstring tightness  Functionally, he has difficulty standing for prolonged periods of time and walking  Patient has MRI pending later this week  Patient in agreement  Patient given supine hamstring stretch for HEP  Patient would continue to benefit from skilled PT services in order to address these deficits and return to PLOF  Impairments: abnormal gait, abnormal movement, activity intolerance, impaired physical strength, lacks appropriate home exercise program and pain with function    Symptom irritability: lowUnderstanding of Dx/Px/POC: good   Prognosis: good    Goals  ST  Patient will be independent with HEP in 3 weeks -met  2  Patient will improve hip strength by 1 grade in 3 weeks -met  3  Patient will decrease self reported pain by 2 points in 3 weeks -met  LT  Patient will be able to stand for >30 min without limitation in 6 weeks -not met  2  Patient will be able to ambulate 50 feet without restrictions or buckling in 6 weeks -not met  3   Patient will be able to complete a shift at work without instances of knee buckling in 6 weeks -not met    Plan  Patient would benefit from: skilled physical therapy  Planned therapy interventions: therapeutic exercise, therapeutic activities, strengthening, patient education, neuromuscular re-education, abdominal trunk stabilization and home exercise program  Frequency: 2x week  Duration in visits: 12  Duration in weeks: 6  Plan of Care beginning date: 2023  Plan of Care expiration date: 3/6/2023  Treatment plan discussed with: patient        Subjective Evaluation    History of Present Illness  Mechanism of injury: Patient reports that R knee is very "wobbly" and L knee is also unsteady  Patient states that they give out on him daily and this has been going on for years  Patient is unsure if he has had any prior knee injuries  No surgical history in the knees  - Patient reports that he feels about 25% better since starting therapy  Patient states that he thinks using heavier weight on the leg press seems to be helping  Pain  Current pain ratin  At best pain ratin  At worst pain ratin  Location: R knee>L knee  Quality: dull ache  Aggravating factors: standing and walking  Progression: improved    Social Support  Stairs in house: yes   12  Lives in: multiple-level home    Employment status: working  Treatments  No previous or current treatments  Patient Goals  Patient goal: more stability        Objective     Concurrent Complaints  Negative for night pain, bladder dysfunction and bowel dysfunction    Tenderness     Right Knee   Tenderness in the inferior patella, lateral joint line and medial joint line       Neurological Testing     Sensation     Lumbar   Left   Intact: light touch    Right   Intact: light touch    Knee   Left Knee   Intact: light touch    Right Knee   Intact: light touch     Reflexes   Left   Patellar (L4): normal (2+)    Right   Patellar (L4): normal (2+)    Active Range of Motion     Lumbar   Flexion:  WFL  Extension: WFL  Left lateral flexion:  WFL  Right lateral flexion:  WFL  Left rotation:  WFL  Right rotation:  WFL  Left Knee   Flexion: WFL  Extension: WFL    Right Knee   Flexion: WFL  Extension: [facility]    Strength/Myotome Testing     Left Hip   Planes of Motion   Flexion: 4+  Extension: 5  Abduction: 5    Right Hip   Planes of Motion   Flexion: 4+  Extension: 5  Abduction: 5    Left Knee   Flexion: 5  Extension: 5    Right Knee   Flexion: 5  Extension: 5    Left Ankle/Foot   Dorsiflexion: 5    Right Ankle/Foot   Dorsiflexion: 5    Tests     Left Knee   Negative anterior drawer, posterior drawer, valgus stress test at 30 degrees and varus stress test at 30 degrees  Right Knee   Negative anterior drawer, posterior drawer, valgus stress test at 30 degrees and varus stress test at 30 degrees       Ambulation     Ambulation: Level Surfaces   Ambulation without assistive device: independent    Additional Level Surfaces Ambulation Details  Ambulated with R LE maintained in increased ER compared to L LE, observable R knee buckling with more than 50% of steps    General Comments:      Knee Comments  SLS R: 30 sec with observed knee instability  SLS L: 30 sec with observed knee instability    Stairs: ascends and descends reciprocally without HR and minimal knee instability noted    Lateral step test: B knee valgus, lateral trunk lean and arm strategy      Flowsheet Rows    Flowsheet Row Most Recent Value   PT/OT G-Codes    Current Score 51   Projected Score 71      Access Code [email or web address]         Precautions: knee buckling    Manuals 1/23 12/20 12/27 12/29 1/3 1/5 1/9 1/10 1/16 1/17                                                       Neuro Re-Ed             Lateral step down x30 ea  8 in step 2x10 2x10 2x10  8" step 2x10  8" step 3x10  6" step 3x10  6" step  2x10  6" step 2x10 6"  step   SLS w ball toss x30 ea 1x10 x20 ea x20 ylw w foam  x20 ylw w foam 2x15 ylw w foam x20 ylw w foam x20 ylw w foam x20 ylw w foam   Bosu lunge       x10 ea x15 ea x15 ea x15 ea   Y balance 3x5     2x5 ea 3x5 ea 2x5 ea 2x5 ea 2x5 ea   SL Pallof press Keis  #8 x20 ea       x20 ea rtb x20 ea gtb x20 ea btb                             Ther Ex             SL hip ABD  3x10  1# 3x10  1# 3x10  #2 3x10  3# 3x10  3# 3x10  3# 3x10  #3 3x10  #3 3x10  #3   Standing hip ABD x10 HEP           LAQ x10 3x10  #3 3x10  3# 4x10  #4 3x10  #5 3x10  #5  3x10  #7 5 3x10  #7 5 3x10  #7 5   HS stretch             SLR  3x10  #3 3x10  #3 4x10  #4 3x10  #4 3x10  #4  3x10  #7 5 3x10  #7 5 3x10  #7 5   TB side stepping  3 laps  gtb 6 laps  gtb 6 laps   btb 6 laps  btb        Clamshell  3x10  rtb 3x10  rtb 3x10  rtb  3x10  gtb 3x10  gtb 3x10  btb  3x10  btb   Monster walk  3 laps  gtb 6 laps  gtb 6 laps  btb 6 laps  btb        PB hamstring curl    2x10 2x10  2x10   2x10   Prone hip ext             Bridges   x20 5"          Bike 6 min 6 min 6 min 6 min 7 min 6 min 6 min 7 min 6 min 6 min   Hamstring stretch 3x30"            Leg press #155  3x10 #45 #85 #85 x30 #125  4x10 #125  4x10 135#  4x10 145#  4x10 145#  4x10 185#  3x10   PT re eval GS            Ther Activity             SL STS  2x10 3x10 3x10 #10 3x10   x10 ea x10 ea x10 ea   Step up lateral  2x10 2x10                       Step up/down             Gait Training                                       Modalities

## 2023-01-24 ENCOUNTER — APPOINTMENT (OUTPATIENT)
Dept: PHYSICAL THERAPY | Facility: CLINIC | Age: 33
End: 2023-01-24

## 2023-01-27 ENCOUNTER — HOSPITAL ENCOUNTER (OUTPATIENT)
Dept: MRI IMAGING | Facility: CLINIC | Age: 33
Discharge: HOME | End: 2023-01-27

## 2023-01-27 DIAGNOSIS — M23.92 INTERNAL DERANGEMENT OF LEFT KNEE: ICD-10-CM

## 2023-01-27 DIAGNOSIS — M23.91 INTERNAL DERANGEMENT OF RIGHT KNEE: ICD-10-CM

## 2023-01-30 ENCOUNTER — OFFICE VISIT (OUTPATIENT)
Dept: PHYSICAL THERAPY | Facility: CLINIC | Age: 33
End: 2023-01-30

## 2023-01-30 DIAGNOSIS — M25.361 KNEE INSTABILITY, RIGHT: Primary | ICD-10-CM

## 2023-01-30 DIAGNOSIS — M62.9 HAMSTRING TIGHTNESS OF BOTH LOWER EXTREMITIES: ICD-10-CM

## 2023-01-30 DIAGNOSIS — R29.898 WEAKNESS OF BOTH HIPS: ICD-10-CM

## 2023-01-30 DIAGNOSIS — M22.2X2 PATELLOFEMORAL SYNDROME OF BOTH KNEES: ICD-10-CM

## 2023-01-30 DIAGNOSIS — M22.2X1 PATELLOFEMORAL SYNDROME OF BOTH KNEES: ICD-10-CM

## 2023-01-30 NOTE — PROGRESS NOTES
Daily Note     Today's date: 2023  Patient name: Jered Boo  : 1990  MRN: [id number]  Referring provider: Cecilia Sloan  Dx:   Encounter Diagnosis     ICD-10-CM    1  Knee instability, right  M25 361       2  Patellofemoral syndrome of both knees  M22 2X1     M22 2X2       3  Weakness of both hips  R29 898       4  Hamstring tightness of both lower extremities  M62 9           Start Time: 730  Stop Time: 820  Total time in clinic (min): 50 minutes    Subjective: Patient had an MRI on Friday, awaiting results this week  Patient reports no change in knee stability  Objective: See treatment diary below      Assessment: Patient demonstrates  this session  Patient completed today's session without increase in pain  Recumbent bike performed in order to improve LE muscular endurance  Patient continues to demonstrate significant hamstring tightness, which could be contributing to his knee buckling during ambulation  Pending MRI results will dictate direction to take for PT  Future sessions should continue to progress strengthening and balance exercises as able  Tolerated treatment well  Patient would benefit from continued PT      Plan: Continue per plan of care        Precautions: knee buckling    Manuals 1/23 1/30 12/27 12/29 1/3 1/5 1/9 1/10 1/16 1/17                                                       Neuro Re-Ed             Lateral step down x30 ea  8 in step x30 ea 8 in step 2x10 2x10  8" step 2x10  8" step 3x10  6" step 3x10  6" step  2x10  6" step 2x10 6"  step   SLS w ball toss x30 ea  x20 ea x20 ylw w foam  x20 ylw w foam 2x15 ylw w foam x20 ylw w foam x20 ylw w foam x20 ylw w foam   Bosu lunge  2x15     x10 ea x15 ea x15 ea x15 ea   Y balance 3x5 3x5    2x5 ea 3x5 ea 2x5 ea 2x5 ea 2x5 ea   SL Pallof press Keis  #8 x20 ea       x20 ea rtb x20 ea gtb x20 ea btb                             Ther Ex             SL hip ABD  3x10  #3 3x10  1# 3x10  #2 3x10  3# 3x10  3# 3x10  3# 3x10  #3 3x10  #3 3x10  #3   Standing hip ABD x10            LAQ x10 3x10  #7 5 3x10  3# 4x10  #4 3x10  #5 3x10  #5  3x10  #7 5 3x10  #7 5 3x10  #7 5   HS stretch             SLR  3x10  #7 5 3x10  #3 4x10  #4 3x10  #4 3x10  #4  3x10  #7 5 3x10  #7 5 3x10  #7 5   TB side stepping   6 laps  gtb 6 laps   btb 6 laps  btb        Clamshell   3x10  rtb 3x10  rtb  3x10  gtb 3x10  gtb 3x10  btb  3x10  btb   Monster walk   6 laps  gtb 6 laps  btb 6 laps  btb        PB hamstring curl    2x10 2x10  2x10   2x10   Prone hip ext             Bridges   x20 5"          Bike 6 min 6 min 6 min 6 min 7 min 6 min 6 min 7 min 6 min 6 min   Hamstring stretch 3x30" 3x30"           Leg press #155  3x10 #185  3x10 #85 #85 x30 #125  4x10 #125  4x10 135#  4x10 145#  4x10 145#  4x10 185#  3x10   PT re eval GS            Ther Activity             SL STS  3x10 3x10 3x10 #10 3x10   x10 ea x10 ea x10 ea   Step up lateral   2x10                       Step up/down             Gait Training                                       Modalities

## 2023-01-31 ENCOUNTER — APPOINTMENT (OUTPATIENT)
Dept: PHYSICAL THERAPY | Facility: CLINIC | Age: 33
End: 2023-01-31

## 2023-02-06 ENCOUNTER — APPOINTMENT (OUTPATIENT)
Dept: PHYSICAL THERAPY | Facility: CLINIC | Age: 33
End: 2023-02-06

## 2023-02-06 ENCOUNTER — OFFICE VISIT (OUTPATIENT)
Dept: OBGYN CLINIC | Facility: CLINIC | Age: 33
End: 2023-02-06

## 2023-02-06 VITALS
BODY MASS INDEX: 34.46 KG/M2 | SYSTOLIC BLOOD PRESSURE: 138 MMHG | HEART RATE: 89 BPM | WEIGHT: 260 LBS | DIASTOLIC BLOOD PRESSURE: 91 MMHG | HEIGHT: 73 IN

## 2023-02-06 DIAGNOSIS — M22.2X2 PATELLOFEMORAL SYNDROME OF BOTH KNEES: Primary | ICD-10-CM

## 2023-02-06 DIAGNOSIS — M21.42 PES PLANUS OF BOTH FEET: ICD-10-CM

## 2023-02-06 DIAGNOSIS — M21.41 PES PLANUS OF BOTH FEET: ICD-10-CM

## 2023-02-06 DIAGNOSIS — M22.2X1 PATELLOFEMORAL SYNDROME OF BOTH KNEES: Primary | ICD-10-CM

## 2023-02-06 NOTE — PROGRESS NOTES
Assessment/Plan:  Assessment/Plan   Diagnoses and all orders for this visit:    Patellofemoral syndrome of both knees  -     Ambulatory Referral to Physical Therapy; Future    Pes planus of both feet  -     Ambulatory Referral to Physical Therapy; Future        22-year-old male [address] [place] [address] with bilateral knee pain more than few years duration  Discussed with patient my results, impression, and plan  MRI of the knees are unremarkable for internal derangement  Clinical impression is that he is symptomatic from abnormal musculoskeletal mechanics  I advised that surgery is not warranted  I recommend he complete current course of formal therapy and afterward to continue with home exercise program focused on musculoskeletal mechanics  He will follow-up with me as needed  Subjective:   Patient ID: Iris Allison is a 27 y o  male  Chief Complaint   Patient presents with   • Left Knee - Follow-up   • Right Knee - Follow-up, Pain       22-year-old male [facility]  following up for bilateral knee pain more than few years duration  He was last seen by me 4 weeks ago at which point he was referred for MRI of both knees  He denies changes in symptoms since his last visit  He has pain described as to the knees but worse at the anterior and posterior aspects, nonradiating, worse with prolonged standing and ablation, worsen rising prolonged sitting, associated with clicking and instability, and improved with resting or changing position  Knee Pain  This is a chronic problem  The current episode started more than 1 year ago  The problem occurs daily  The problem has been unchanged  Associated symptoms include arthralgias  Pertinent negatives include no joint swelling, numbness or weakness  The symptoms are aggravated by standing and walking  He has tried rest (Knee brace, formal therapy, home exercise) for the symptoms  The treatment provided mild relief                 Review of Systems   Musculoskeletal: Positive for arthralgias  Negative for joint swelling  Neurological: Negative for weakness and numbness  Objective:  Vitals:    02/06/23 0904   BP: 138/91   Pulse: 89   Weight: 118 kg (260 lb)   Height: 6' 1" (1 854 m)     Ortho Exam    Physical Exam  Vitals and nursing note reviewed  Constitutional:       General: He is not in acute distress  Appearance: He is well-developed  He is not ill-appearing or diaphoretic  HENT:      Head: Normocephalic and atraumatic  Right Ear: External ear normal       Left Ear: External ear normal    Eyes:      Conjunctiva/sclera: Conjunctivae normal    Neck:      Trachea: No tracheal deviation  Cardiovascular:      Rate and Rhythm: Normal rate  Pulmonary:      Effort: Pulmonary effort is normal  No respiratory distress  Abdominal:      General: There is no distension  Skin:     General: Skin is warm and dry  Coloration: Skin is not jaundiced or pale  Neurological:      Mental Status: He is alert and oriented to person, place, and time  Psychiatric:         Mood and Affect: Mood normal          Behavior: Behavior normal          Thought Content: Thought content normal          Judgment: Judgment normal          I have personally reviewed pertinent films in PACS and my interpretation is No appreciable internal derangement of the knees

## 2023-02-07 ENCOUNTER — APPOINTMENT (OUTPATIENT)
Dept: PHYSICAL THERAPY | Facility: CLINIC | Age: 33
End: 2023-02-07

## 2023-02-13 ENCOUNTER — OFFICE VISIT (OUTPATIENT)
Dept: PHYSICAL THERAPY | Facility: CLINIC | Age: 33
End: 2023-02-13

## 2023-02-13 DIAGNOSIS — R29.898 WEAKNESS OF BOTH HIPS: ICD-10-CM

## 2023-02-13 DIAGNOSIS — M22.2X1 PATELLOFEMORAL SYNDROME OF BOTH KNEES: ICD-10-CM

## 2023-02-13 DIAGNOSIS — M62.9 HAMSTRING TIGHTNESS OF BOTH LOWER EXTREMITIES: ICD-10-CM

## 2023-02-13 DIAGNOSIS — M22.2X2 PATELLOFEMORAL SYNDROME OF BOTH KNEES: ICD-10-CM

## 2023-02-13 DIAGNOSIS — M25.361 KNEE INSTABILITY, RIGHT: Primary | ICD-10-CM

## 2023-02-13 NOTE — PROGRESS NOTES
PT Discharge    Today's date: 2023  Patient name: Judy Blanton  : 1990  MRN: [id number]  Referring provider: Humaira Hill  Dx:   Encounter Diagnosis     ICD-10-CM    1  Knee instability, right  M25 361       2  Patellofemoral syndrome of both knees  M22 2X1     M22 2X2       3  Weakness of both hips  R29 898       4  Hamstring tightness of both lower extremities  M62 9           Start Time: 735  Stop Time: 818  Total time in clinic (min): 43 minutes    Assessment  Assessment details: Patient is a 27year old male who has been attending OP PT for the last 2 months with chief complaints of frequent R knee buckling/instability and B knee pain  Patient presents with full knee ROM, negative tests for ligamentous instability and intact sensation bilaterally  Over the past 2 months patient has made significant improvements in B LE strength  Patient had an MRI 2 weeks ago which revealed some misalignment of his knee caps but no other deficits  Doctor told patient he no longer needs therapy and is ready to get back to the gym  I also encouraged patient to get back to the gym to continue LE strengthening as able  Patient expressed understanding  Patient should be discharged to [facility] for maintenance at this time  Encouraged patient to contact me with any questions or concerns in the future  Goals  ST  Patient will be independent with HEP in 3 weeks -met  2  Patient will improve hip strength by 1 grade in 3 weeks -met  3  Patient will decrease self reported pain by 2 points in 3 weeks -met  LT  Patient will be able to stand for >30 min without limitation in 6 weeks -not met  2  Patient will be able to ambulate 50 feet without restrictions or buckling in 6 weeks -not met  3   Patient will be able to complete a shift at work without instances of knee buckling in 6 weeks -not met    Plan  Plan details: D/c to [facility]  Treatment plan discussed with: patient        Subjective Evaluation    History of Present Illness  Mechanism of injury: Patient reports that R knee is very "wobbly" and L knee is also unsteady  Patient states that they give out on him daily and this has been going on for years  Patient is unsure if he has had any prior knee injuries  No surgical history in the knees  - Patient reports that he feels about 25% better since starting therapy  Patient states that he thinks using heavier weight on the leg press seems to be helping  Pain  Current pain ratin  At best pain ratin  At worst pain ratin  Location: R knee>L knee  Quality: dull ache  Aggravating factors: standing and walking  Progression: improved    Social Support  Stairs in house: yes   12  Lives in: multiple-level home    Employment status: working  Treatments  No previous or current treatments  Patient Goals  Patient goal: more stability        Objective     Concurrent Complaints  Negative for night pain, bladder dysfunction and bowel dysfunction    Tenderness     Right Knee   Tenderness in the inferior patella, lateral joint line and medial joint line       Neurological Testing     Sensation     Lumbar   Left   Intact: light touch    Right   Intact: light touch    Knee   Left Knee   Intact: light touch    Right Knee   Intact: light touch     Reflexes   Left   Patellar (L4): normal (2+)    Right   Patellar (L4): normal (2+)    Active Range of Motion     Lumbar   Flexion:  WFL  Extension:  WFL  Left lateral flexion:  WFL  Right lateral flexion:  WFL  Left rotation:  WFL  Right rotation:  WFL  Left Knee   Flexion: WFL  Extension: WFL    Right Knee   Flexion: WFL  Extension: WFL    Strength/Myotome Testing     Left Hip   Planes of Motion   Flexion: 5  Extension: 5  Abduction: 5    Right Hip   Planes of Motion   Flexion: 4+  Extension: 5  Abduction: 5    Left Knee   Flexion: 5  Extension: 5    Right Knee   Flexion: 5  Extension: 5    Left Ankle/Foot   Dorsiflexion: 5    Right Ankle/Foot Dorsiflexion: 5    Tests     Left Knee   Negative anterior drawer, posterior drawer, valgus stress test at 30 degrees and varus stress test at 30 degrees  Right Knee   Negative anterior drawer, posterior drawer, valgus stress test at 30 degrees and varus stress test at 30 degrees       Ambulation     Ambulation: Level Surfaces   Ambulation without assistive device: independent    Additional Level Surfaces Ambulation Details  Ambulated with R LE maintained in increased ER compared to L LE, observable R knee buckling with more than 50% of steps    General Comments:      Knee Comments  SLS R: 30 sec with observed knee instability  SLS L: 30 sec with observed knee instability    Stairs: ascends and descends reciprocally without HR and minimal knee instability noted    Lateral step test: B knee valgus, lateral trunk lean and arm strategy      Flowsheet Rows    Flowsheet Row Most Recent Value   PT/OT G-Codes    Current Score 54   Projected Score 71      Access Code [email or web address]         Precautions: knee buckling    Manuals 1/23 1/30 2/13 12/29 1/3 1/5 1/9 1/10 1/16 1/17                                                       Neuro Re-Ed             Lateral step down x30 ea  8 in step x30 ea 8 in step x30 ea 8 in step 2x10  8" step 2x10  8" step 3x10  6" step 3x10  6" step  2x10  6" step 2x10 6"  step   SLS w ball toss x30 ea   x20 ylw w foam  x20 ylw w foam 2x15 ylw w foam x20 ylw w foam x20 ylw w foam x20 ylw w foam   Bosu lunge  2x15 2x10    x10 ea x15 ea x15 ea x15 ea   Y balance 3x5 3x5 3x5   2x5 ea 3x5 ea 2x5 ea 2x5 ea 2x5 ea   SL Pallof press Keis  #8 x20 ea       x20 ea rtb x20 ea gtb x20 ea btb                             Ther Ex             SL hip ABD  3x10  #3  3x10  #2 3x10  3# 3x10  3# 3x10  3# 3x10  #3 3x10  #3 3x10  #3   Standing hip ABD x10            LAQ x10 3x10  #7 5  4x10  #4 3x10  #5 3x10  #5  3x10  #7 5 3x10  #7 5 3x10  #7 5   HS stretch             SLR  3x10  #7 5  4x10  #4 3x10  #4 3x10  #4 3x10  #7 5 3x10  #7 5 3x10  #7 5   TB side stepping    6 laps   btb 6 laps  btb        Clamshell    3x10  rtb  3x10  gtb 3x10  gtb 3x10  btb  3x10  btb   Monster walk    6 laps  btb 6 laps  btb        PB hamstring curl   3x10 2x10 2x10  2x10   2x10   Prone hip ext             Bridges             Bike 6 min 6 min 6 min 6 min 7 min 6 min 6 min 7 min 6 min 6 min   Hamstring stretch 3x30" 3x30" 3x30"          Leg press #155  3x10 #185  3x10 #185  3x10 #85 x30 #125  4x10 #125  4x10 135#  4x10 145#  4x10 145#  4x10 185#  3x10   PT re eval GS  GS          Ther Activity             SL STS  3x10  3x10 #10 3x10   x10 ea x10 ea x10 ea   Step up lateral                          Step up/down             Gait Training                                       Modalities

## 2023-02-14 ENCOUNTER — APPOINTMENT (OUTPATIENT)
Dept: PHYSICAL THERAPY | Facility: CLINIC | Age: 33
End: 2023-02-14

## 2024-04-17 ENCOUNTER — OFFICE VISIT (OUTPATIENT)
Age: 34
End: 2024-04-17
Payer: COMMERCIAL

## 2024-04-17 VITALS
WEIGHT: 246 LBS | RESPIRATION RATE: 18 BRPM | SYSTOLIC BLOOD PRESSURE: 129 MMHG | TEMPERATURE: 97.6 F | HEART RATE: 73 BPM | DIASTOLIC BLOOD PRESSURE: 83 MMHG | BODY MASS INDEX: 32.46 KG/M2 | OXYGEN SATURATION: 97 %

## 2024-04-17 DIAGNOSIS — J02.9 SORE THROAT: ICD-10-CM

## 2024-04-17 DIAGNOSIS — B34.9 VIRAL SYNDROME: Primary | ICD-10-CM

## 2024-04-17 LAB — S PYO AG THROAT QL: NEGATIVE

## 2024-04-17 PROCEDURE — 87070 CULTURE OTHR SPECIMN AEROBIC: CPT | Performed by: PHYSICIAN ASSISTANT

## 2024-04-17 PROCEDURE — 87880 STREP A ASSAY W/OPTIC: CPT | Performed by: PHYSICIAN ASSISTANT

## 2024-04-17 PROCEDURE — 99213 OFFICE O/P EST LOW 20 MIN: CPT | Performed by: PHYSICIAN ASSISTANT

## 2024-04-17 RX ORDER — OMEPRAZOLE 20 MG/1
20 CAPSULE, DELAYED RELEASE ORAL
COMMUNITY
Start: 2024-02-05

## 2024-04-17 RX ORDER — ALBUTEROL SULFATE 90 UG/1
2 AEROSOL, METERED RESPIRATORY (INHALATION) EVERY 6 HOURS PRN
Qty: 6.7 G | Refills: 0 | Status: SHIPPED | OUTPATIENT
Start: 2024-04-17

## 2024-04-17 RX ORDER — ALBUTEROL SULFATE 90 UG/1
2 AEROSOL, METERED RESPIRATORY (INHALATION) EVERY 6 HOURS PRN
COMMUNITY

## 2024-04-17 NOTE — PROGRESS NOTES
[facility]        NAME: Juan Biswas is a 33 y.o. male  : 1990    MRN: [id number]  DATE: 2024  TIME: 11:42 AM    Assessment and Plan   Viral syndrome [B34.9]  1. Viral syndrome  albuterol (Proventil HFA) 90 mcg/act inhaler      2. Sore throat  POCT rapid ANTIGEN strepA    Throat culture    Throat culture            Patient Instructions     Rpaid strep was negative; culture was sent  I suspect a viral syndrome  Increase fluids  Tylenol as needed  Rest  Tessalon as directed fr cough  Refilled your Albuterol MDI as directed/needed      Follow up with PCP in 3-5 days.  Proceed to  ER if symptoms worsen.    If tests are performed, our office will contact you with results only if changes need to made to the care plan discussed with you at the visit. [facility]    Chief Complaint     Chief Complaint   Patient presents with    Sore Throat     Pt states for over a week he has had headaches, joint pain, diarrhea, fever, cough, sore throat, nausea/vomiting         History of Present Illness       URI   This is a new problem. The current episode started in the past 7 days. The problem has been waxing and waning. The maximum temperature recorded prior to his arrival was 100.4 - 100.9 F. The fever has been present for 1 to 2 days. Associated symptoms include congestion, coughing, diarrhea, headaches, nausea, rhinorrhea and a sore throat. Pertinent negatives include no abdominal pain, ear pain, rash, sinus pain, vomiting or wheezing. He has tried acetaminophen and decongestant for the symptoms. The treatment provided mild relief.       Review of Systems   Review of Systems   Constitutional:  Positive for activity change, appetite change and fever.   HENT:  Positive for congestion, rhinorrhea and sore throat. Negative for ear pain and sinus pain.    Respiratory:  Positive for cough. Negative for wheezing.    Gastrointestinal:  Positive for diarrhea and  nausea. Negative for abdominal pain and vomiting.   Skin:  Negative for rash.   Neurological:  Positive for headaches.         Current Medications       Current Outpatient Medications:     albuterol (Proventil HFA) 90 mcg/act inhaler, Inhale 2 puffs every 6 (six) hours as needed for wheezing or shortness of breath, Disp: 6.7 g, Rfl: 0    albuterol (PROVENTIL HFA,VENTOLIN HFA) 90 mcg/act inhaler, Inhale 2 puffs every 6 (six) hours as needed for wheezing, Disp: , Rfl:     omeprazole (PriLOSEC) 20 mg delayed release capsule, Take 20 mg by mouth, Disp: , Rfl:     Current Allergies     Allergies as of 04/17/2024    (No Known Allergies)            The following portions of the patient's history were reviewed and updated as appropriate: allergies, current medications, past family history, past medical history, past social history, past surgical history and problem list.     History reviewed. No pertinent past medical history.    Past Surgical History:   Procedure Laterality Date    ELBOW SURGERY Right     SHOULDER SURGERY         Family History   Problem Relation Age of Onset    No Known Problems Mother     No Known Problems Father          Medications have been verified.        Objective   /83   Pulse 73   Temp 97.6 °F (36.4 °C)   Resp 18   Wt 112 kg (246 lb)   SpO2 97%   BMI 32.46 kg/m²        Physical Exam     Physical Exam  Vitals and nursing note reviewed.   Constitutional:       General: He is not in acute distress.     Appearance: Normal appearance. He is well-developed and normal weight. He is not ill-appearing, toxic-appearing or diaphoretic.   HENT:      Head: Normocephalic and atraumatic.      Right Ear: Tympanic membrane, ear canal and external ear normal.      Left Ear: Tympanic membrane, ear canal and external ear normal.      Nose: Congestion and rhinorrhea present.      Mouth/Throat:      Mouth: Mucous membranes are moist. Oral lesions present.      Pharynx: Pharyngeal swelling, posterior  oropharyngeal erythema and uvula swelling present. No oropharyngeal exudate.      Tonsils: No tonsillar exudate or tonsillar abscesses. 0 on the right. 0 on the left.   Eyes:      General: No scleral icterus.     Extraocular Movements: Extraocular movements intact.      Conjunctiva/sclera: Conjunctivae normal.      Pupils: Pupils are equal, round, and reactive to light.   Neck:      Thyroid: No thyromegaly.   Cardiovascular:      Rate and Rhythm: Normal rate and regular rhythm.      Pulses: Normal pulses.      Heart sounds: Normal heart sounds.   Pulmonary:      Effort: Pulmonary effort is normal. No respiratory distress.      Breath sounds: Normal breath sounds.   Abdominal:      General: Abdomen is flat.   Musculoskeletal:         General: Normal range of motion.      Cervical back: Normal range of motion and neck supple. No tenderness.   Lymphadenopathy:      Cervical: No cervical adenopathy.   Skin:     General: Skin is warm and dry.      Findings: No rash.   Neurological:      General: No focal deficit present.      Mental Status: He is alert and oriented to person, place, and time.      Gait: Gait normal.   Psychiatric:         Mood and Affect: Mood normal.         Behavior: Behavior normal.         Thought Content: Thought content normal.         Judgment: Judgment normal.

## 2024-04-17 NOTE — PATIENT INSTRUCTIONS
Pharyngitis   WHAT YOU NEED TO KNOW:   Pharyngitis, or sore throat, is inflammation of the tissues and structures in your pharynx (throat). Pharyngitis is most often caused by bacteria or a virus. Other causes include smoking, allergies, or acid reflux.  DISCHARGE INSTRUCTIONS:   Call your local emergency number (911 in the ) if:   You have trouble breathing or swallowing because your throat is swollen.      Return to the emergency department if:   You are drooling because it hurts too much to swallow.    Your fever is higher than 102?F (39?C) or lasts longer than 3 days.    You are confused.    You taste blood.    Call your doctor if:   Your throat pain gets worse.    You have a painful lump in your throat that does not go away after 5 days.    Your symptoms do not improve after 5 days.    You have questions or concerns about your condition or care.    Medicines:  Viral pharyngitis will go away on its own without treatment. Your sore throat should start to feel better in 3 to 5 days. You may need any of the following:  Antibiotics  treat a bacterial infection.    NSAIDs  help decrease swelling and pain or fever. This medicine is available with or without a doctor's order. NSAIDs can cause stomach bleeding or kidney problems in certain people. If you take blood thinner medicine, always ask your healthcare provider if NSAIDs are safe for you. Always read the medicine label and follow directions.    Acetaminophen  decreases pain and fever. It is available without a doctor's order. Ask how much to take and how often to take it. Follow directions. Read the labels of all other medicines you are using to see if they also contain acetaminophen, or ask your doctor or pharmacist. Acetaminophen can cause liver damage if not taken correctly.    Take your medicine as directed.  Contact your healthcare provider if you think your medicine is not helping or if you have side effects. Tell your provider if you are allergic to any  medicine. Keep a list of the medicines, vitamins, and herbs you take. Include the amounts, and when and why you take them. Bring the list or the pill bottles to follow-up visits. Carry your medicine list with you in case of an emergency.    Manage your symptoms:   Gargle salt water.  Mix ¼ teaspoon salt in an 8 ounce glass of warm water and gargle. Do not swallow. Salt water may help decrease swelling in your throat.    Drink liquids as directed.  You may need to drink more liquids than usual. Liquids may help soothe your throat and prevent dehydration. Ask how much liquid to drink each day and which liquids are best for you.    Use a cool mist humidifier.  This will add moisture to the air and help decrease your cough.    Soothe your throat.  Cough drops, ice, soft foods, or popsicles may help decrease throat pain.    Prevent the spread of pharyngitis:  Cover your mouth and nose when you cough or sneeze. Do not share food or drinks. Wash your hands often. Use soap and water. If soap and water are unavailable, use an alcohol-based hand .       Follow up with your doctor as directed:  Write down your questions so you remember to ask them during your visits.  © Copyright [employer] 2023 Information is for End User's use only and may not be sold, redistributed or otherwise used for commercial purposes.  The above information is an  only. It is not intended as medical advice for individual conditions or treatments. Talk to your doctor, nurse or pharmacist before following any medical regimen to see if it is safe and effective for you.  Viral Syndrome   WHAT YOU NEED TO KNOW:   Viral syndrome is a term used for symptoms of an infection caused by a virus. Viruses are spread easily from person to person on shared items.  DISCHARGE INSTRUCTIONS:   Call your local emergency number (727 in the [place]), or have someone call if:   You have a seizure.    You cannot be woken.    You have chest pain or trouble  breathing.    Return to the emergency department if:   You have a stiff neck, a bad headache, and sensitivity to light.    You feel weak, dizzy, or confused.    You stop urinating or urinate a lot less than usual.    You cough up blood or thick yellow or green mucus.    You have severe abdominal pain or your abdomen is larger than usual.    Call your doctor if:   Your symptoms do not get better with treatment or get worse after 3 days.    You have a rash or ear pain.    You have burning when you urinate.    You have questions or concerns about your condition or care.    Medicines:  You may  need any of the following:  Acetaminophen  decreases pain and fever. It is available without a doctor's order. Ask how much to take and how often to take it. Follow directions. Read the labels of all other medicines you are using to see if they also contain acetaminophen, or ask your doctor or pharmacist. Acetaminophen can cause liver damage if not taken correctly.    NSAIDs , such as ibuprofen, help decrease swelling, pain, and fever. NSAIDs can cause stomach bleeding or kidney problems in certain people. If you take blood thinner medicine, always ask your healthcare provider if NSAIDs are safe for you. Always read the medicine label and follow directions.    Cold medicine  helps decrease swelling, control a cough, and relieve chest or nasal congestion.     Saline nasal spray  helps decrease nasal congestion.     Take your medicine as directed.  Contact your healthcare provider if you think your medicine is not helping or if you have side effects. Tell your provider if you are allergic to any medicine. Keep a list of the medicines, vitamins, and herbs you take. Include the amounts, and when and why you take them. Bring the list or the pill bottles to follow-up visits. Carry your medicine list with you in case of an emergency.    Manage your symptoms:   Drink liquids as directed to prevent dehydration.  Ask how much liquid to  drink each day and which liquids are best for you. Do not drink liquids with caffeine. Caffeine can make dehydration worse.     Get plenty of rest to help your body heal.  Take naps throughout the day. Ask your healthcare provider when you can return to work and your normal activities.    Use a cool mist humidifier  to increase air moisture in your home. This may make it easier for you to breathe and help decrease your cough.     Drink tea with honey or use cough drops for a sore throat.  Cough drops are available without a doctor's order. Follow directions for taking cough drops.    Do not smoke or be close to anyone who is smoking.  Nicotine and other chemicals in cigarettes and cigars can cause lung damage. Smoking can also delay healing. Ask your healthcare provider for information if you currently smoke and need help to quit. E-cigarettes or smokeless tobacco still contain nicotine. Talk to your healthcare provider before you use these products.    Prevent the spread of germs:   Wash your hands often throughout the day.  Use soap and water. Rub your soapy hands together, lacing your fingers, for at least 20 seconds. Rinse with warm, running water. Dry your hands with a clean towel or paper towel. Use hand  that contains alcohol if soap and water are not available. Teach children how to wash their hands and use hand .         Cover sneezes and coughs.  Turn your face away and cover your mouth and nose with a tissue. Throw the tissue away. Use the bend of your arm if a tissue is not available. Then wash your hands well with soap and water or use hand . Teach children how to cover a cough or sneeze.    Stay home while you are sick.  Avoid crowds as much as possible.    Get the influenza (flu) vaccine as soon as recommended each year.  The flu vaccine is available starting in September or October. Ask your healthcare provider about the pneumonia vaccine. This vaccine is usually  recommended every 5 years in older adults.       Follow up with your doctor as directed:  Write down your questions so you remember to ask them during your visits.  © Copyright [employer] 2023 Information is for End User's use only and may not be sold, redistributed or otherwise used for commercial purposes.  The above information is an  only. It is not intended as medical advice for individual conditions or treatments. Talk to your doctor, nurse or pharmacist before following any medical regimen to see if it is safe and effective for you.

## 2024-04-17 NOTE — LETTER
April 17, 2024     Patient: Juan Biswas   YOB: 1990   Date of Visit: 4/17/2024       To Whom it May Concern:    Juan Biswas was seen in my clinic on 4/17/2024. He may return to work on 4/19/2024 .    If you have any questions or concerns, please don't hesitate to call.         Sincerely,          Abilio Schafer PA-C        CC: No Recipients

## 2024-04-20 LAB — BACTERIA THROAT CULT: NORMAL

## 2024-10-28 ENCOUNTER — EVALUATION (OUTPATIENT)
Dept: PHYSICAL THERAPY | Facility: CLINIC | Age: 34
End: 2024-10-28
Payer: COMMERCIAL

## 2024-10-28 DIAGNOSIS — M75.81 TENDINITIS OF RIGHT ROTATOR CUFF: Primary | ICD-10-CM

## 2024-10-28 DIAGNOSIS — G89.29 CHRONIC RIGHT SHOULDER PAIN: ICD-10-CM

## 2024-10-28 DIAGNOSIS — M25.511 CHRONIC RIGHT SHOULDER PAIN: ICD-10-CM

## 2024-10-28 PROCEDURE — 97112 NEUROMUSCULAR REEDUCATION: CPT

## 2024-10-28 PROCEDURE — 97110 THERAPEUTIC EXERCISES: CPT

## 2024-10-28 PROCEDURE — 97161 PT EVAL LOW COMPLEX 20 MIN: CPT

## 2024-10-28 NOTE — LETTER
2024    Feliciano Kilgore  [address] [facility] [address]    Patient: Juan Biswas   YOB: 1990   Date of Visit: 10/28/2024     Encounter Diagnosis     ICD-10-CM    1. Tendinitis of right rotator cuff  M75.81       2. Chronic right shoulder pain  M25.511     G89.29           Dear Dr. Kilgore:    Thank you for your recent referral of Juan Biswas. Please review the attached evaluation summary from Juan's recent visit.     Please verify that you agree with the plan of care by signing the attached order.     If you have any questions or concerns, please do not hesitate to call.     I sincerely appreciate the opportunity to share in the care of one of your patients and hope to have another opportunity to work with you in the near future.       Sincerely,    Rose Munroe, PT, DPT      Referring Provider:      I certify that I have read the below Plan of Care and certify the need for these services furnished under this plan of treatment while under my care.                    Feliciano Kilgore  [address] [id number]  Via Fax: [phone]          PT Evaluation     Today's date: 10/28/2024  Patient name: Juan Biswas  : 1990  MRN: [id number]  Referring provider: Feliciano Kilgore  Dx:   Encounter Diagnosis     ICD-10-CM    1. Tendinitis of right rotator cuff  M75.81       2. Chronic right shoulder pain  M25.511     G89.29                      Assessment  Impairments: abnormal or restricted ROM, activity intolerance, impaired physical strength, lacks appropriate home exercise program, pain with function, scapular dyskinesis, poor posture , participation limitations, activity limitations and endurance    Assessment details: Pt is a 34 y.o. male presenting to PT services with c/o chronic R shoulder pain. Pt has painful, but full AROM in RUE. Pt experiences sensation of instability with end range of movement. Pt has impaired RUE strength in comparison to LUE. Given pt's  surgical history, PT and pt have discussed and agreed that pt will benefit from skilled physical therapy in order to improve shoulder stability. PT discussed use of blood flow restriction therapy with pt, pt will read informational sheet sent home with him and return the signed consent form if this is something he wishes to participate with. PT added ball on wall, rows, and shoulder IR/ER to pt's HEP, pt verbalized and demonstrated understanding of proper form. Pt is a good candidate for skilled physical therapy in order to improve R shoulder mobility, R shoulder stability, decrease pain, and improve functional ability.       Goals  STG (3 weeks):  1. Pt will improve R shoulder flexion strength to be 5/5 without increase in pain  2. Pt will improve R shoulder IR strength to be 5/5 without increase in pain   3. Pt will report pain at worst as 4/10 or less     LTG (5 weeks):  1. Pt will be independent in HEP  2. Pt will improve global RUE strength to be 5/5   3. Pt will be able to reach back seat of truck without increase in pain     Plan  Patient would benefit from: skilled physical therapy, home program and duarable medical equipment  Planned modality interventions: biofeedback, cryotherapy, TENS, neuromuscular electric stimulation, thermotherapy: hydrocollator packs and unattended electrical stimulation    Planned therapy interventions: IASTM, joint mobilization, kinesiology taping, manual therapy, massage, abdominal trunk stabilization, balance, nerve gliding, neuromuscular re-education, patient/caregiver education, postural training, therapeutic activities, strengthening, stretching, therapeutic exercise, functional ROM exercises, flexibility and home exercise program    Frequency: 1x week  Duration in weeks: 5  Plan of Care beginning date: 10/28/2024  Plan of Care expiration date: 12/6/2024  Treatment plan discussed with: patient        Subjective Evaluation    History of Present Illness  Mechanism of injury: Pt  "reports that his R shoulder has been in pain for a while, since . He states that he had a combat fitness test in the [employer] that started the pain. He states that they did a biceps tenodesis, labral tear repair. He states that they did an MRI and said he has tears in his shoulder. He states that the pain is pretty constant. He states that his fingers go numb if he is holding his phone for too long, but he also has a plate in his elbow. Reaching behind him has increased pain.   Patient Goals  Patient goals for therapy: decreased pain, increased strength, increased motion and return to sport/leisure activities  Patient goal: \"to strengthen up the shoulder.\"  Pain  Current pain ratin  At best pain ratin  At worst pain ratin  Location: deep in R shoulder joint  Quality: radiating and burning  Alleviating factors: putting shoulder behind back.  Aggravating factors: overhead activity (pushing/pulling, reaching to back seat of car)    Social Support  Steps to enter house: yes (4 steps, 1 hand rails)  Stairs in house: yes (2 flights, 1 hand rail)   Lives in: multiple-level home  Lives with: spouse (2 dogs)    Employment status: working ()  Hand dominance: left      Diagnostic Tests  MRI studies: abnormal  Treatments  Previous treatment: injection treatment and physical therapy (accupuncture)        Objective     Active Range of Motion     Right Shoulder   Flexion: WFL and with pain  Abduction: WFL  External rotation BTH: WFL and with pain  Internal rotation BTB: WFL    Strength/Myotome Testing     Left Shoulder   Normal muscle strength    Right Shoulder     Planes of Motion   Flexion: 4   Abduction: 4   Internal rotation at 0°: 4     Isolated Muscles   Latissimus: 4+   Lower trapezius: 4-   Middle trapezius: 4              Precautions: None  Access Code: [email or web address]     POC expires Unit limit Auth Expiration date PT/OT/ST + Visit Limit?   24 BOMN 24 6                           Visit/Unit " Tracking  AUTH Status:  Date 10/28              Required - pending Used 1               Remaining  5                    Date 10/28            Re-Eval             FOTO             Manuals                                                                 Neuro Re-Ed             Ball on wall Flx/abd x30 cw/ccw + 1 ABC            Rows GTB 3x10            LT ER                                                                 Ther Ex             UBE             Shoulder ER GTB 3x10            Shoulder IR  GTB 3x10                                                                             Ther Activity                                       Gait Training                                       Modalities

## 2024-10-28 NOTE — PROGRESS NOTES
PT Evaluation     Today's date: 10/28/2024  Patient name: Juan Biswas  : 1990  MRN: [id number]  Referring provider: Feliciano Kilgore  Dx:   Encounter Diagnosis     ICD-10-CM    1. Tendinitis of right rotator cuff  M75.81       2. Chronic right shoulder pain  M25.511     G89.29                      Assessment  Impairments: abnormal or restricted ROM, activity intolerance, impaired physical strength, lacks appropriate home exercise program, pain with function, scapular dyskinesis, poor posture , participation limitations, activity limitations and endurance    Assessment details: Pt is a 34 y.o. male presenting to PT services with c/o chronic R shoulder pain. Pt has painful, but full AROM in RUE. Pt experiences sensation of instability with end range of movement. Pt has impaired RUE strength in comparison to LUE. Given pt's surgical history, PT and pt have discussed and agreed that pt will benefit from skilled physical therapy in order to improve shoulder stability. PT discussed use of blood flow restriction therapy with pt, pt will read informational sheet sent home with him and return the signed consent form if this is something he wishes to participate with. PT added ball on wall, rows, and shoulder IR/ER to pt's HEP, pt verbalized and demonstrated understanding of proper form. Pt is a good candidate for skilled physical therapy in order to improve R shoulder mobility, R shoulder stability, decrease pain, and improve functional ability.       Goals  STG (3 weeks):  1. Pt will improve R shoulder flexion strength to be 5/5 without increase in pain  2. Pt will improve R shoulder IR strength to be 5/5 without increase in pain   3. Pt will report pain at worst as 4/10 or less     LTG (5 weeks):  1. Pt will be independent in HEP  2. Pt will improve global RUE strength to be 5/5   3. Pt will be able to reach back seat of truck without increase in pain     Plan  Patient would benefit from: skilled physical therapy,  "home program and duarable medical equipment  Planned modality interventions: biofeedback, cryotherapy, TENS, neuromuscular electric stimulation, thermotherapy: hydrocollator packs and unattended electrical stimulation    Planned therapy interventions: IASTM, joint mobilization, kinesiology taping, manual therapy, massage, abdominal trunk stabilization, balance, nerve gliding, neuromuscular re-education, patient/caregiver education, postural training, therapeutic activities, strengthening, stretching, therapeutic exercise, functional ROM exercises, flexibility and home exercise program    Frequency: 1x week  Duration in weeks: 5  Plan of Care beginning date: 10/28/2024  Plan of Care expiration date: 2024  Treatment plan discussed with: patient        Subjective Evaluation    History of Present Illness  Mechanism of injury: Pt reports that his R shoulder has been in pain for a while, since . He states that he had a combat fitness test in the [employer] that started the pain. He states that they did a biceps tenodesis, labral tear repair. He states that they did an MRI and said he has tears in his shoulder. He states that the pain is pretty constant. He states that his fingers go numb if he is holding his phone for too long, but he also has a plate in his elbow. Reaching behind him has increased pain.   Patient Goals  Patient goals for therapy: decreased pain, increased strength, increased motion and return to sport/leisure activities  Patient goal: \"to strengthen up the shoulder.\"  Pain  Current pain ratin  At best pain ratin  At worst pain ratin  Location: deep in R shoulder joint  Quality: radiating and burning  Alleviating factors: putting shoulder behind back.  Aggravating factors: overhead activity (pushing/pulling, reaching to back seat of car)    Social Support  Steps to enter house: yes (4 steps, 1 hand rails)  Stairs in house: yes (2 flights, 1 hand rail)   Lives in: multiple-level " home  Lives with: spouse (2 dogs)    Employment status: working ()  Hand dominance: left      Diagnostic Tests  MRI studies: abnormal  Treatments  Previous treatment: injection treatment and physical therapy (accupuncture)        Objective     Active Range of Motion     Right Shoulder   Flexion: WFL and with pain  Abduction: WFL  External rotation BTH: WFL and with pain  Internal rotation BTB: WFL    Strength/Myotome Testing     Left Shoulder   Normal muscle strength    Right Shoulder     Planes of Motion   Flexion: 4   Abduction: 4   Internal rotation at 0°: 4     Isolated Muscles   Latissimus: 4+   Lower trapezius: 4-   Middle trapezius: 4              Precautions: None  Access Code: [email or web address]     POC expires Unit limit Auth Expiration date PT/OT/ST + Visit Limit?   12/6/24 BOMN 12/31/24 6                           Visit/Unit Tracking  AUTH Status:  Date 10/28              Required - pending Used 1               Remaining  5                    Date 10/28            Re-Eval             FOTO             Manuals                                                                 Neuro Re-Ed             Ball on wall Flx/abd x30 cw/ccw + 1 ABC            Rows GTB 3x10            LT ER                                                                 Ther Ex             UBE             Shoulder ER GTB 3x10            Shoulder IR  GTB 3x10                                                                             Ther Activity                                       Gait Training                                       Modalities

## 2024-11-05 ENCOUNTER — OFFICE VISIT (OUTPATIENT)
Dept: PHYSICAL THERAPY | Facility: CLINIC | Age: 34
End: 2024-11-05
Payer: COMMERCIAL

## 2024-11-05 DIAGNOSIS — M75.81 TENDINITIS OF RIGHT ROTATOR CUFF: Primary | ICD-10-CM

## 2024-11-05 DIAGNOSIS — G89.29 CHRONIC RIGHT SHOULDER PAIN: ICD-10-CM

## 2024-11-05 DIAGNOSIS — M25.511 CHRONIC RIGHT SHOULDER PAIN: ICD-10-CM

## 2024-11-05 PROCEDURE — 97110 THERAPEUTIC EXERCISES: CPT

## 2024-11-05 PROCEDURE — 97140 MANUAL THERAPY 1/> REGIONS: CPT

## 2024-11-05 PROCEDURE — 97112 NEUROMUSCULAR REEDUCATION: CPT

## 2024-11-05 NOTE — PROGRESS NOTES
"Daily Note     Today's date: 2024  Patient name: Juan Biswas  : 1990  MRN: [id number]  Referring provider: Feliciano Kilgore, *  Dx:   Encounter Diagnosis     ICD-10-CM    1. Tendinitis of right rotator cuff  M75.81       2. Chronic right shoulder pain  M25.511     G89.29           Start Time: 1100  Stop Time: 1143  Total time in clinic (min): 43 minutes    Subjective: Pt reports that he has been getting some sharp pain in his shoulder just at rest recently.       Objective: See treatment diary below      Assessment: Pt is challenged with shoulder muscle stabilization and endurance. Pt responds favorably to manual therapy with decreased pain and tension. Most challenged with shoulder ER. Will update HEP NV.       Plan: Continue per plan of care.  Progress treatment as tolerated.       Precautions: None  Access Code: [email or web address]     POC expires Unit limit Auth Expiration date PT/OT/ST + Visit Limit?   24 BOMN 24 6                           Visit/Unit Tracking  AUTH Status:  Date 10/28 11/5             Approved 6 visits Used 1 2              Remaining  5 4                   Date 10/28 11/5           Re-Eval             FOTO             Manuals             Dead fish mob  SY GIII           Inf GH mob  SY GIII           A-P GH mob  SY GIII           Subscapularis pin and stretch release   SY                                     Neuro Re-Ed             Ball on wall Flx/abd x30 cw/ccw + 1 ABC Flx/abd x30 cw/ccw + 1 ABC           Rows GTB 3x10 GTB 3x10           LT ER  RTB 3\"x20           BodyBlade   10\"x4                                                  Ther Ex             UBE  3'/3'           Shoulder ER GTB 3x10 GTB 3x10           Shoulder IR  GTB 3x10 GTB 3x10           Sleeper stretch  30\"x3                                                               Ther Activity             U/l dirty baby carry   RMB 3 laps                         Gait Training                                     "   Modalities

## 2024-11-15 ENCOUNTER — APPOINTMENT (OUTPATIENT)
Dept: PHYSICAL THERAPY | Facility: CLINIC | Age: 34
End: 2024-11-15
Payer: COMMERCIAL

## 2024-11-19 ENCOUNTER — OFFICE VISIT (OUTPATIENT)
Dept: PHYSICAL THERAPY | Facility: CLINIC | Age: 34
End: 2024-11-19
Payer: COMMERCIAL

## 2024-11-19 DIAGNOSIS — M75.81 TENDINITIS OF RIGHT ROTATOR CUFF: Primary | ICD-10-CM

## 2024-11-19 DIAGNOSIS — M25.511 CHRONIC RIGHT SHOULDER PAIN: ICD-10-CM

## 2024-11-19 DIAGNOSIS — G89.29 CHRONIC RIGHT SHOULDER PAIN: ICD-10-CM

## 2024-11-19 PROCEDURE — 97110 THERAPEUTIC EXERCISES: CPT

## 2024-11-19 PROCEDURE — 97140 MANUAL THERAPY 1/> REGIONS: CPT

## 2024-11-19 PROCEDURE — 97112 NEUROMUSCULAR REEDUCATION: CPT

## 2024-11-19 NOTE — PROGRESS NOTES
"Daily Note     Today's date: 2024  Patient name: Juan Biswas  : 1990  MRN: [id number]  Referring provider: No ref. provider found  Dx:   Encounter Diagnosis     ICD-10-CM    1. Tendinitis of right rotator cuff  M75.81       2. Chronic right shoulder pain  M25.511     G89.29                      Subjective: Pt reports continued R shoulder pain.  He is able to work through what he has to.        Objective: See treatment diary below      Assessment: Pt presents with 90/90 ER/IR ROM restrictions.  He demonstrates fatigue with newly added shoulder ER strengthening.  Fair motor control of scapular musculature.  Pt would benefit from continued PT.      Plan: Continue per plan of care.      Precautions: None  Access Code: [email or web address]     POC expires Unit limit Auth Expiration date PT/OT/ST + Visit Limit?   24 BOMN 24 6                           Visit/Unit Tracking  AUTH Status:  Date 10/28 11/5 11/19            Approved 6 visits Used 1 2 3             Remaining  5 4 3                  Date 10/28 11/5 11/19          Re-Eval             FOTO             Manuals             Dead fish mob  SY GIII           Inf GH mob  SY GIII           A-P GH mob  SY GIII           Subscapularis pin and stretch release   SY AF                                    Neuro Re-Ed             Ball on wall Flx/abd x30 cw/ccw + 1 ABC Flx/abd x30 cw/ccw + 1 ABC Flx/abd x30 cw/ccw +1 abc          Rows GTB 3x10 GTB 3x10 BTB 3x10          LT ER  RTB 3\"x20 GTB 3\"x20          BodyBlade   10\"x4 10\"x4                                                 Ther Ex             UBE  3'/3' 3'/3'          Shoulder ER GTB 3x10 GTB 3x10 GTB 3x10          Shoulder IR  GTB 3x10 GTB 3x10 GTB 3x10          Sleeper stretch  30\"x3 30\"x3          S/l ER   2# 2x12          90/90 seated ER   2# 2x10                                    Ther Activity             U/l dirty baby carry   RMB 3 laps                         Gait Training                                "        Modalities

## 2024-11-25 ENCOUNTER — APPOINTMENT (OUTPATIENT)
Dept: PHYSICAL THERAPY | Facility: CLINIC | Age: 34
End: 2024-11-25
Payer: COMMERCIAL